# Patient Record
Sex: FEMALE | Employment: FULL TIME | URBAN - NONMETROPOLITAN AREA
[De-identification: names, ages, dates, MRNs, and addresses within clinical notes are randomized per-mention and may not be internally consistent; named-entity substitution may affect disease eponyms.]

---

## 2024-09-05 ENCOUNTER — TELEPHONE (OUTPATIENT)
Dept: FAMILY MEDICINE CLINIC | Facility: CLINIC | Age: 34
End: 2024-09-05

## 2024-09-09 ENCOUNTER — HOSPITAL ENCOUNTER (EMERGENCY)
Facility: HOSPITAL | Age: 34
Discharge: HOME | End: 2024-09-09
Attending: EMERGENCY MEDICINE
Payer: COMMERCIAL

## 2024-09-09 VITALS
OXYGEN SATURATION: 99 % | DIASTOLIC BLOOD PRESSURE: 88 MMHG | TEMPERATURE: 97.6 F | HEART RATE: 76 BPM | SYSTOLIC BLOOD PRESSURE: 162 MMHG | RESPIRATION RATE: 18 BRPM

## 2024-09-09 DIAGNOSIS — F41.9 ANXIETY: Primary | ICD-10-CM

## 2024-09-09 PROCEDURE — 99282 EMERGENCY DEPT VISIT SF MDM: CPT

## 2024-09-09 PROCEDURE — 99284 EMERGENCY DEPT VISIT MOD MDM: CPT | Performed by: EMERGENCY MEDICINE

## 2024-09-09 NOTE — Clinical Note
Dayanara Orantes was seen and treated in our emergency department on 9/9/2024.    No restrictions            Diagnosis:     Dayanara  may return to work on return date.    She may return on this date: 09/11/2024    Patient was seen in the ED for treatment of anxiety and well need off for this reason to address the issue medically.      If you have any questions or concerns, please don't hesitate to call.      Kristan Thomas, DO    ______________________________           _______________          _______________  Hospital Representative                              Date                                Time

## 2024-09-09 NOTE — Clinical Note
Dayanara Orantes was seen and treated in our emergency department on 9/9/2024.    No restrictions            Diagnosis:     Dayanara  may return to work on return date.    She may return on this date: 09/11/2024         If you have any questions or concerns, please don't hesitate to call.      Kristan Thomas, DO    ______________________________           _______________          _______________  Hospital Representative                              Date                                Time

## 2024-09-09 NOTE — ED PROVIDER NOTES
History  Chief Complaint   Patient presents with    Anxiety     Patient states she's been having a lot of anxiety due to working a lot of hours.Denies and SI/HI     Dayanara Bhatia is a 35 y/o F with PMHx of HTN who presents for anxiety. Patient states that recently she has been struggling with feelings of anxiety and having to sit down a lot a work. 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 She has been working constantly which is worsening the anxiety. Because of the interruptions to her work, her job asked her to get a letter documenting the anxiety. Reports previously being on an anxiety medication but is not on one now. In need of a PCP to manage this and her HTN. Came to the ED to get some medication for long term anxiety management. Denies SI/HI. Denies fever, chest pain, SOB, N/V, constipation, diarrhea.         Prior to Admission Medications   Prescriptions Last Dose Informant Patient Reported? Taking?   valACYclovir (VALTREX) 1,000 mg tablet   No No   Sig: Take 2 tablets (2,000 mg total) by mouth 2 (two) times a day for 1 day      Facility-Administered Medications: None       Past Medical History:   Diagnosis Date    Hypertension        Past Surgical History:   Procedure Laterality Date    BACK SURGERY         Family History   Problem Relation Age of Onset    Hypertension Mother     Hypertension Father      I have reviewed and agree with the history as documented.    E-Cigarette/Vaping    E-Cigarette Use Never User      E-Cigarette/Vaping Substances    Nicotine No     THC No     CBD No     Flavoring No     Other No     Unknown No      Social History     Tobacco Use    Smoking status: Never    Smokeless tobacco: Never   Vaping Use    Vaping status: Never Used   Substance Use Topics    Alcohol use: Yes     Comment: occasionally    Drug use: Not Currently        Review of Systems   Constitutional:  Negative for fever.   Respiratory:  Negative for  cough and shortness of breath.    Cardiovascular:  Negative for chest pain.   Gastrointestinal:  Negative for nausea and vomiting.   Genitourinary:  Negative for dysuria.   Musculoskeletal:  Negative for gait problem.   Skin:  Negative for rash.   Neurological:  Negative for syncope.   Psychiatric/Behavioral:  Negative for suicidal ideas. The patient is nervous/anxious.        Physical Exam  ED Triage Vitals [09/09/24 1248]   Temperature Pulse Respirations Blood Pressure SpO2   97.6 °F (36.4 °C) 76 18 162/88 99 %      Temp Source Heart Rate Source Patient Position - Orthostatic VS BP Location FiO2 (%)   Tympanic Monitor Sitting Right arm --      Pain Score       No Pain             Orthostatic Vital Signs  Vitals:    09/09/24 1248   BP: 162/88   Pulse: 76   Patient Position - Orthostatic VS: Sitting       Physical Exam  Vitals and nursing note reviewed.   Constitutional:       General: She is not in acute distress.     Appearance: Normal appearance. She is normal weight. She is not ill-appearing or toxic-appearing.   HENT:      Head: Normocephalic and atraumatic.      Nose: Nose normal.   Eyes:      Conjunctiva/sclera: Conjunctivae normal.   Cardiovascular:      Rate and Rhythm: Normal rate and regular rhythm.      Heart sounds: Normal heart sounds. No murmur heard.     No friction rub. No gallop.   Pulmonary:      Effort: Pulmonary effort is normal. No respiratory distress.      Breath sounds: Normal breath sounds. No wheezing, rhonchi or rales.   Skin:     General: Skin is warm and dry.      Findings: No rash.   Neurological:      General: No focal deficit present.      Mental Status: She is alert and oriented to person, place, and time.      Gait: Gait normal.   Psychiatric:         Mood and Affect: Mood normal.         Behavior: Behavior normal.         Thought Content: Thought content normal.         ED Medications  Medications - No data to display    Diagnostic Studies  Results Reviewed       None                    No orders to display         Procedures  Procedures      ED Course  ED Course as of 09/09/24 1341   Mon Sep 09, 2024   1248 Blood Pressure: 162/88  Patient anxious   1248 Temperature: 97.6 °F (36.4 °C)   1248 Pulse: 76   1248 SpO2: 99 %   1248 Respirations: 18                                       Medical Decision Making  35 y/o F with PMHx of HTN who presents for anxiety. Patient at low risk with no feelings of SI/HI. Discussed at length with her that the ED cannot provide long term anxiety management medication. Offered to see patient in [facility] to address issues further. Patient is agreeable to coming to a 2:40 PM appointment at [facility] on 9/10/24. Wrote note giving patient off until Wednesday 9/11/24 so that she is able to come to appointment tomorrow and manage anxiety symptoms in the meantime. Recommend rest, deep breathing exercises. Discussed treatment and plan with patient who verbalized understanding and agreement.     Problems Addressed:  Anxiety: chronic illness or injury    Amount and/or Complexity of Data Reviewed  External Data Reviewed: notes.    Risk  OTC drugs.  Prescription drug management.  Risk Details: Pt seen in ED for anxiety   Low risk for SI/HI   Recommend meditation, rest   Appointment made at [facility] for 9/10/24 at 2:40 PM   Pt safe for discharge home           Disposition  Final diagnoses:   Anxiety     Time reflects when diagnosis was documented in both MDM as applicable and the Disposition within this note       Time User Action Codes Description Comment    9/9/2024  1:07 PM Kristan Thomas Add [F41.9] Anxiety           ED Disposition       ED Disposition   Discharge    Condition   Stable    Date/Time   Mon Sep 9, 2024  1:08 PM    Comment   Dayanara Orantes discharge to home/self care.                   Follow-up Information       Follow up With Specialties Details Why Contact Info Additional Information    .  [facility] Medicine Go on 9/10/2024 2:40 PM [address] [facility] [id number] [phone] [facility], [address] [place] [address], [id number]  [phone]            Discharge Medication List as of 9/9/2024  1:08 PM        CONTINUE these medications which have NOT CHANGED    Details   valACYclovir (VALTREX) 1,000 mg tablet Take 2 tablets (2,000 mg total) by mouth 2 (two) times a day for 1 day, Starting Mon 8/19/2024, Until Tue 8/20/2024, Normal               PDMP Review       None             ED Provider  Attending physically available and evaluated Dayanara Lamberto. I managed the patient along with the ED Attending.    Electronically Signed by:    Kristan Thomas DO   PGY-2 Rural  Residency   [facility]      Kristan Thomas DO  09/09/24 0874

## 2024-09-09 NOTE — ED ATTENDING ATTESTATION
9/9/2024  I, Ruth Maciel MD, saw and evaluated the patient. I have discussed the patient with the resident/non-physician practitioner and agree with the resident's/non-physician practitioner's findings, Plan of Care, and MDM as documented in the resident's/non-physician practitioner's note, except where noted. All available labs and Radiology studies were reviewed.  I was present for key portions of any procedure(s) performed by the resident/non-physician practitioner and I was immediately available to provide assistance.       At this point I agree with the current assessment done in the Emergency Department.  I have conducted an independent evaluation of this patient a history and physical is as follows:    ED Course  34-year-old female presents with increased anxiety patient has a remote history of being on medication she is not currently on any medication she has a history of hypertension and tachycardia and is looking to establish with a doctor in the area.  There is no history of HI or SI. Np chest pain no SOB feels her covid symptoms have resovled was sent in from work.  PSHX back surg    VS noted with bp 162/88  HEENT normal  Chest CTA no wheezing prolongataion of exp phase no converasational dyspnea  CVs1s2 HR 80  Ext no edema  Neuro exam CN2-12 grossing intact over the extremities symmetrically gait steady        FM note from 12/1/22 reviewed.  As well as 2 previous ED visits 1 for COVID and the second for fever blisters.     Patient to follow up with Dr. Thomas tomorrow in the office to establish care    Critical Care Time  Procedures

## 2024-10-14 ENCOUNTER — HOSPITAL ENCOUNTER (EMERGENCY)
Facility: HOSPITAL | Age: 34
Discharge: HOME | End: 2024-10-14
Attending: EMERGENCY MEDICINE | Admitting: EMERGENCY MEDICINE
Payer: COMMERCIAL

## 2024-10-14 VITALS
OXYGEN SATURATION: 100 % | TEMPERATURE: 98 F | HEART RATE: 72 BPM | RESPIRATION RATE: 16 BRPM | DIASTOLIC BLOOD PRESSURE: 91 MMHG | SYSTOLIC BLOOD PRESSURE: 158 MMHG

## 2024-10-14 DIAGNOSIS — J06.9 URI (UPPER RESPIRATORY INFECTION): Primary | ICD-10-CM

## 2024-10-14 LAB
FLUAV AG UPPER RESP QL IA.RAPID: NEGATIVE
FLUBV AG UPPER RESP QL IA.RAPID: NEGATIVE
SARS-COV+SARS-COV-2 AG RESP QL IA.RAPID: NEGATIVE

## 2024-10-14 PROCEDURE — 99283 EMERGENCY DEPT VISIT LOW MDM: CPT | Performed by: EMERGENCY MEDICINE

## 2024-10-14 PROCEDURE — 87804 INFLUENZA ASSAY W/OPTIC: CPT | Performed by: EMERGENCY MEDICINE

## 2024-10-14 PROCEDURE — 87811 SARS-COV-2 COVID19 W/OPTIC: CPT | Performed by: EMERGENCY MEDICINE

## 2024-10-14 PROCEDURE — 99284 EMERGENCY DEPT VISIT MOD MDM: CPT

## 2024-10-14 NOTE — Clinical Note
Dayanara Orantes was seen and treated in our emergency department on 10/14/2024.                Diagnosis: viral illness    Dayanara  .    She may return on this date: 10/17/2024         If you have any questions or concerns, please don't hesitate to call.      Trish Garza MD    ______________________________           _______________          _______________  Hospital Representative                              Date                                Time

## 2024-10-15 NOTE — ED PROVIDER NOTES
Time reflects when diagnosis was documented in both MDM as applicable and the Disposition within this note       Time User Action Codes Description Comment    10/14/2024 10:18 PM DallasTrish Ritter Add [J06.9] URI (upper respiratory infection)           ED Disposition       ED Disposition   Discharge    Condition   Stable    Date/Time   Mon Oct 14, 2024 10:18 PM    Comment   Dayanara Orantes discharge to home/self care.                   Assessment & Plan       Medical Decision Making  Amount and/or Complexity of Data Reviewed  Labs: ordered.      34-year-old female presenting for evaluation of upper respiratory symptoms for three days, vitals normal.   Likely viral syndrome. Will test for covid/flu per patient request for work. Will provide patient a note for work. Discussed continued symptomatic treatment. Discussed strict return precautions.  Patient expressed understanding and was agreeable for discharge.       Medications - No data to display    ED Risk Strat Scores                                               History of Present Illness       Chief Complaint   Patient presents with    URI     Congestion, sneezing, coughing x3 days       Past Medical History:   Diagnosis Date    Hypertension       Past Surgical History:   Procedure Laterality Date    BACK SURGERY        Family History   Problem Relation Age of Onset    Hypertension Mother     Hypertension Father       Social History     Tobacco Use    Smoking status: Never    Smokeless tobacco: Never   Vaping Use    Vaping status: Never Used   Substance Use Topics    Alcohol use: Yes     Comment: occasionally    Drug use: Not Currently      E-Cigarette/Vaping    E-Cigarette Use Never User       E-Cigarette/Vaping Substances    Nicotine No     THC No     CBD No     Flavoring No     Other No     Unknown No       I have reviewed and agree with the history as documented.     HPI    34-year-old female presenting for evaluation of upper respiratory symptoms.   Patient states she has had cough and congestion for the past 3 days.  She was also having fevers today.  She did take Tylenol prior to coming to the ER.  She states she feels fatigued and has had myalgias.  Denies chest pain or shortness of breath.  She has had slightly decreased appetite.  Denies diarrhea.  Denies abdominal pain.  Denies leg swelling.  She states her son was also sick with similar symptoms recently.  She states she at work today and was sent and sent to the ER for evaluation.    Review of Systems   Constitutional:  Positive for appetite change and fever. Negative for chills.   HENT:  Positive for congestion and rhinorrhea. Negative for sore throat.    Respiratory:  Positive for cough. Negative for shortness of breath.    Cardiovascular:  Negative for chest pain.   Gastrointestinal:  Negative for abdominal pain, diarrhea, nausea and vomiting.   Genitourinary:  Negative for dysuria, frequency, hematuria and urgency.   Musculoskeletal:  Positive for myalgias. Negative for arthralgias.   Skin:  Negative for rash.   Neurological:  Negative for dizziness, weakness, light-headedness, numbness and headaches.   All other systems reviewed and are negative.          Objective       ED Triage Vitals   Temperature Pulse Blood Pressure Respirations SpO2 Patient Position - Orthostatic VS   10/14/24 2208 10/14/24 2210 10/14/24 2210 10/14/24 2208 10/14/24 2210 --   98 °F (36.7 °C) 72 158/91 16 100 %       Temp Source Heart Rate Source BP Location FiO2 (%) Pain Score    10/14/24 2208 10/14/24 2210 -- -- --    Temporal Monitor         Vitals      Date and Time Temp Pulse SpO2 Resp BP Pain Score FACES Pain Rating User   10/14/24 2210 -- 72 100 % -- 158/91 -- -- JL   10/14/24 2208 98 °F (36.7 °C) -- -- 16 -- -- -- JL            Physical Exam  Vitals and nursing note reviewed.   Constitutional:       General: She is not in acute distress.     Appearance: Normal appearance. She is well-developed and normal weight. She is  not ill-appearing, toxic-appearing or diaphoretic.   HENT:      Head: Normocephalic and atraumatic.      Right Ear: External ear normal.      Left Ear: External ear normal.      Nose: Nose normal.      Mouth/Throat:      Mouth: Mucous membranes are moist.      Pharynx: Oropharynx is clear.   Eyes:      Extraocular Movements: Extraocular movements intact.      Conjunctiva/sclera: Conjunctivae normal.   Cardiovascular:      Rate and Rhythm: Normal rate and regular rhythm.      Pulses: Normal pulses.      Heart sounds: Normal heart sounds. No murmur heard.     No friction rub. No gallop.   Pulmonary:      Effort: Pulmonary effort is normal. No respiratory distress.      Breath sounds: Normal breath sounds. No wheezing or rales.   Abdominal:      General: There is no distension.      Palpations: Abdomen is soft.      Tenderness: There is no abdominal tenderness. There is no guarding or rebound.   Musculoskeletal:         General: No tenderness.      Cervical back: Neck supple.      Right lower leg: No edema.      Left lower leg: No edema.   Skin:     General: Skin is warm and dry.      Coloration: Skin is not pale.      Findings: No erythema or rash.   Neurological:      General: No focal deficit present.      Mental Status: She is alert and oriented to person, place, and time.      Cranial Nerves: No cranial nerve deficit.      Sensory: No sensory deficit.      Motor: No weakness.   Psychiatric:         Mood and Affect: Mood normal.         Behavior: Behavior normal.         Results Reviewed       Procedure Component Value Units Date/Time    FLU/COVID Rapid Antigen (30 min. TAT) - Preferred screening test in ED [[id number]]  (Normal) Collected: 10/14/24 2222    Lab Status: Final result Specimen: Nares from Nose Updated: 10/14/24 2246     SARS COV Rapid Antigen Negative     Influenza A Rapid Antigen Negative     Influenza B Rapid Antigen Negative    Narrative:      This test has been performed using the Quidel Danuta 2  FLU+SARS Antigen test under the Emergency Use Authorization (EUA). This test has been validated by the  and verified by the performing laboratory. The Danuta uses lateral flow immunofluorescent sandwich assay to detect SARS-COV, Influenza A and Influenza B Antigen.     The Popbasic[employer] Danuta 2 SARS Antigen test does not differentiate between SARS-CoV and SARS-CoV-2.     Negative results are presumptive and may be confirmed with a molecular assay, if necessary, for patient management. Negative results do not rule out SARS-CoV-2 or influenza infection and should not be used as the sole basis for treatment or patient management decisions. A negative test result may occur if the level of antigen in a sample is below the limit of detection of this test.     Positive results are indicative of the presence of viral antigens, but do not rule out bacterial infection or co-infection with other viruses.     All test results should be used as an adjunct to clinical observations and other information available to the provider.    FOR PEDIATRIC PATIENTS - copy/paste COVID Guidelines URL to browser: [email or web address]            No orders to display       Procedures    ED Medication and Procedure Management   Prior to Admission Medications   Prescriptions Last Dose Informant Patient Reported? Taking?   valACYclovir (VALTREX) 1,000 mg tablet   No No   Sig: Take 2 tablets (2,000 mg total) by mouth 2 (two) times a day for 1 day      Facility-Administered Medications: None     Discharge Medication List as of 10/14/2024 10:19 PM        CONTINUE these medications which have NOT CHANGED    Details   valACYclovir (VALTREX) 1,000 mg tablet Take 2 tablets (2,000 mg total) by mouth 2 (two) times a day for 1 day, Starting Mon 8/19/2024, Until Tue 8/20/2024, Normal           No discharge procedures on file.  ED SEPSIS DOCUMENTATION   Time reflects when diagnosis was documented in  both MDM as applicable and the Disposition within this note       Time User Action Codes Description Comment    10/14/2024 10:18 PM Trish Garza Add [J06.9] URI (upper respiratory infection)                  Trish Garza MD  10/15/24 0400

## 2024-12-05 ENCOUNTER — HOSPITAL ENCOUNTER (EMERGENCY)
Facility: HOSPITAL | Age: 34
Discharge: HOME | End: 2024-12-05
Attending: EMERGENCY MEDICINE
Payer: COMMERCIAL

## 2024-12-05 VITALS
SYSTOLIC BLOOD PRESSURE: 148 MMHG | OXYGEN SATURATION: 98 % | HEART RATE: 74 BPM | BODY MASS INDEX: 25.61 KG/M2 | TEMPERATURE: 99.1 F | DIASTOLIC BLOOD PRESSURE: 91 MMHG | WEIGHT: 150 LBS | HEIGHT: 64 IN | RESPIRATION RATE: 18 BRPM

## 2024-12-05 DIAGNOSIS — R53.1 WEAKNESS: ICD-10-CM

## 2024-12-05 DIAGNOSIS — B34.9 VIRAL SYNDROME: Primary | ICD-10-CM

## 2024-12-05 DIAGNOSIS — M79.10 MYALGIA: ICD-10-CM

## 2024-12-05 PROCEDURE — 99283 EMERGENCY DEPT VISIT LOW MDM: CPT

## 2024-12-05 PROCEDURE — 99284 EMERGENCY DEPT VISIT MOD MDM: CPT | Performed by: EMERGENCY MEDICINE

## 2024-12-05 NOTE — Clinical Note
Dayanara Jacksonkristopheralex was seen and treated in our emergency department on 12/5/2024.                Diagnosis:     Dayanara  .    She may return on this date:     Excused on 12/5 for viral syndrome, severe body aches, weakness     If you have any questions or concerns, please don't hesitate to call.      Tico Willams MD    ______________________________           _______________          _______________  Hospital Representative                              Date                                Time

## 2024-12-05 NOTE — Clinical Note
Dayanara Orantes was seen and treated in our emergency department on 12/5/2024.                Diagnosis:     Dayanara  .    She may return on this date: 12/06/2024    Excused on 12/5 for viral syndrome, severe body aches, weakness     If you have any questions or concerns, please don't hesitate to call.      Tico Willams MD    ______________________________           _______________          _______________  Hospital Representative                              Date                                Time

## 2024-12-09 NOTE — ED PROVIDER NOTES
Time reflects when diagnosis was documented in both MDM as applicable and the Disposition within this note       Time User Action Codes Description Comment    12/5/2024 10:55 PM Tico Willams [B34.9] Viral syndrome     12/5/2024 10:55 PM Tico Willams [M79.10] Myalgia     12/5/2024 10:55 PM Tico Willams [R53.1] Weakness           ED Disposition       ED Disposition   Discharge    Condition   Stable    Date/Time   u Dec 5, 2024 10:55 PM    Comment   Dayanara Orantes discharge to home/self care.                   Assessment & Plan       Medical Decision Making  I reviewed the patient's medical chart, PMHx, prior encounters, medications.    My DDx includes: Viral syndrome, covid, influenza, RSV    Patient's symptoms are consistent with viral syndrome, no evidence of focal bacterial infection.  Will provide patient a work note, discharged with strict return precautions, recommend alternating Tylenol and Motrin for symptomatic relief.             Medications - No data to display    ED Risk Strat Scores                           SBIRT 20yo+      Flowsheet Row Most Recent Value   Initial Alcohol Screen: US AUDIT-C     1. How often do you have a drink containing alcohol? 0 Filed at: 12/05/2024 2248   2. How many drinks containing alcohol do you have on a typical day you are drinking?  0 Filed at: 12/05/2024 2248   3b. FEMALE Any Age, or MALE 65+: How often do you have 4 or more drinks on one occassion? 0 Filed at: 12/05/2024 2248   Audit-C Score 0 Filed at: 12/05/2024 2248   SAMMI: How many times in the past year have you...    Used an illegal drug or used a prescription medication for non-medical reasons? Never Filed at: 12/05/2024 2248                            History of Present Illness       Chief Complaint   Patient presents with    Generalized Body Aches     Pt presents with body aches, headaches, tiredness, and breast pain.  Patient unable to go to work at [employer] tonight        Past Medical History:   Diagnosis Date    Hypertension       Past Surgical History:   Procedure Laterality Date    BACK SURGERY        Family History   Problem Relation Age of Onset    Hypertension Mother     Hypertension Father       Social History     Tobacco Use    Smoking status: Never    Smokeless tobacco: Never   Vaping Use    Vaping status: Never Used   Substance Use Topics    Alcohol use: Yes     Comment: occasionally    Drug use: Not Currently      E-Cigarette/Vaping    E-Cigarette Use Never User       E-Cigarette/Vaping Substances    Nicotine No     THC No     CBD No     Flavoring No     Other No     Unknown No       I have reviewed and agree with the history as documented.     34 y.o. female who presents for flu-like illness. Patient reports symptoms started 1 days ago. Reports no fevers,+ congestion, cough, , no ear pain, no sore throat, no abdominal pain, no nausea +/- vomiting, no diarrhea, no decreased appetite. + myalgias.  ROS otherwise negative.          Review of Systems   Constitutional:  Negative for chills and fever.   HENT:  Negative for rhinorrhea and sore throat.    Respiratory:  Negative for shortness of breath.    Cardiovascular:  Negative for chest pain and palpitations.   Gastrointestinal:  Negative for abdominal pain, constipation, diarrhea, nausea and vomiting.   Genitourinary:  Negative for difficulty urinating and flank pain.   Musculoskeletal:  Positive for myalgias. Negative for arthralgias.   Neurological:  Negative for dizziness, weakness, light-headedness and headaches.   Psychiatric/Behavioral:  Negative for agitation, behavioral problems and confusion.    All other systems reviewed and are negative.          Objective       ED Triage Vitals [12/05/24 2245]   Temperature Pulse Blood Pressure Respirations SpO2 Patient Position - Orthostatic VS   99.1 °F (37.3 °C) 74 148/91 18 98 % Sitting      Temp Source Heart Rate Source BP Location FiO2 (%) Pain Score    Temporal  Monitor Right arm -- 10 - Worst Possible Pain      Vitals      Date and Time Temp Pulse SpO2 Resp BP Pain Score FACES Pain Rating User   12/05/24 2245 99.1 °F (37.3 °C) 74 98 % 18 148/91 10 - Worst Possible Pain -- BB            Physical Exam  Vitals and nursing note reviewed.   Constitutional:       Appearance: She is well-developed.      Comments: Well-appearing   HENT:      Head: Normocephalic and atraumatic.      Right Ear: Tympanic membrane normal.      Left Ear: Tympanic membrane normal.      Ears:      Comments: Left TM is gray, with no bulging or erythema.  Right TM is gray, with no bulging or erythema.     Nose: Nose normal. No congestion or rhinorrhea.      Mouth/Throat:      Mouth: Mucous membranes are moist.      Pharynx: No oropharyngeal exudate or posterior oropharyngeal erythema.      Comments: Moist mucous membranes. No tonsillar enlargement, erythema, exudate. No evidence of PTA/RPA  Eyes:      General:         Right eye: No discharge.         Left eye: No discharge.      Conjunctiva/sclera: Conjunctivae normal.   Cardiovascular:      Rate and Rhythm: Normal rate and regular rhythm.      Heart sounds: Normal heart sounds. No murmur heard.     No friction rub.   Pulmonary:      Effort: Pulmonary effort is normal. No respiratory distress.      Breath sounds: Normal breath sounds. No stridor. No wheezing, rhonchi or rales.      Comments: Clear breath sounds BL, no respiratory distress  Abdominal:      General: Abdomen is flat. Bowel sounds are normal. There is no distension.      Palpations: Abdomen is soft.      Tenderness: There is no abdominal tenderness.      Comments: Belly is soft, non-tender.    Musculoskeletal:         General: No deformity. Normal range of motion.      Cervical back: Normal range of motion and neck supple.   Skin:     General: Skin is warm and dry.      Capillary Refill: Capillary refill takes less than 2 seconds.      Findings: No rash.   Neurological:      Mental Status: She  is alert and oriented to person, place, and time. Mental status is at baseline.      Coordination: Coordination normal.   Psychiatric:         Mood and Affect: Mood normal.         Behavior: Behavior normal.         Thought Content: Thought content normal.         Judgment: Judgment normal.         Results Reviewed       None            No orders to display       Procedures    ED Medication and Procedure Management   Prior to Admission Medications   Prescriptions Last Dose Informant Patient Reported? Taking?   valACYclovir (VALTREX) 1,000 mg tablet   No No   Sig: Take 2 tablets (2,000 mg total) by mouth 2 (two) times a day for 1 day      Facility-Administered Medications: None     Discharge Medication List as of 12/5/2024 10:56 PM        CONTINUE these medications which have NOT CHANGED    Details   valACYclovir (VALTREX) 1,000 mg tablet Take 2 tablets (2,000 mg total) by mouth 2 (two) times a day for 1 day, Starting Mon 8/19/2024, Until Tue 8/20/2024, Normal           No discharge procedures on file.  ED SEPSIS DOCUMENTATION   Time reflects when diagnosis was documented in both MDM as applicable and the Disposition within this note       Time User Action Codes Description Comment    12/5/2024 10:55 PM Tico Willams [B34.9] Viral syndrome     12/5/2024 10:55 PM Tico Willams [M79.10] Myalgia     12/5/2024 10:55 PM Tico Willams [R53.1] Weakness                  Tico Willams MD  12/09/24 0233

## 2024-12-23 ENCOUNTER — HOSPITAL ENCOUNTER (EMERGENCY)
Facility: HOSPITAL | Age: 34
Discharge: HOME | End: 2024-12-23
Attending: EMERGENCY MEDICINE
Payer: COMMERCIAL

## 2024-12-23 VITALS
RESPIRATION RATE: 20 BRPM | SYSTOLIC BLOOD PRESSURE: 156 MMHG | TEMPERATURE: 97.6 F | HEART RATE: 90 BPM | DIASTOLIC BLOOD PRESSURE: 99 MMHG | OXYGEN SATURATION: 97 %

## 2024-12-23 DIAGNOSIS — J02.9 PHARYNGITIS: Primary | ICD-10-CM

## 2024-12-23 LAB
FLUAV AG UPPER RESP QL IA.RAPID: NEGATIVE
FLUBV AG UPPER RESP QL IA.RAPID: NEGATIVE
S PYO DNA THROAT QL NAA+PROBE: DETECTED
SARS-COV+SARS-COV-2 AG RESP QL IA.RAPID: NEGATIVE

## 2024-12-23 PROCEDURE — 99284 EMERGENCY DEPT VISIT MOD MDM: CPT | Performed by: EMERGENCY MEDICINE

## 2024-12-23 PROCEDURE — 87811 SARS-COV-2 COVID19 W/OPTIC: CPT

## 2024-12-23 PROCEDURE — 87804 INFLUENZA ASSAY W/OPTIC: CPT

## 2024-12-23 PROCEDURE — 87651 STREP A DNA AMP PROBE: CPT

## 2024-12-23 PROCEDURE — 99283 EMERGENCY DEPT VISIT LOW MDM: CPT

## 2024-12-23 RX ORDER — IBUPROFEN 600 MG/1
600 TABLET, FILM COATED ORAL ONCE
Status: COMPLETED | OUTPATIENT
Start: 2024-12-23 | End: 2024-12-23

## 2024-12-23 RX ORDER — AMOXICILLIN 250 MG/1
500 CAPSULE ORAL ONCE
Status: COMPLETED | OUTPATIENT
Start: 2024-12-23 | End: 2024-12-23

## 2024-12-23 RX ORDER — AMOXICILLIN 500 MG/1
500 CAPSULE ORAL 3 TIMES DAILY
Qty: 21 CAPSULE | Refills: 0 | Status: SHIPPED | OUTPATIENT
Start: 2024-12-23 | End: 2024-12-30

## 2024-12-23 RX ADMIN — AMOXICILLIN 500 MG: 250 CAPSULE ORAL at 21:16

## 2024-12-23 RX ADMIN — IBUPROFEN 600 MG: 600 TABLET, FILM COATED ORAL at 21:16

## 2024-12-23 RX ADMIN — DEXAMETHASONE SODIUM PHOSPHATE 10 MG: 10 INJECTION, SOLUTION INTRAMUSCULAR; INTRAVENOUS at 21:16

## 2024-12-23 NOTE — Clinical Note
Dayanara Orantes was seen and treated in our emergency department on 12/23/2024.                Diagnosis: Streptococcal pharyngitis    Dayanara  may return to work on return date.    She may return on this date: 12/24/2024    Dayanara Orantes required evaluation in the emergency department for an infection that required a physician evaluation and initiation of antibiotics. Please excuse absence on 12/23/2024. Patient is cleared to return to work on 12/24/2024.      If you have any questions or concerns, please don't hesitate to call.      Jessie Amaya MD    ______________________________           _______________          _______________  Hospital Representative                              Date                                Time

## 2024-12-23 NOTE — Clinical Note
Dayanara Orantes was seen and treated in our emergency department on 12/23/2024.                Diagnosis:     Dayanara  may return to work on return date.    She may return on this date: 12/24/2024    Please excuse absence on 12/23/2024     If you have any questions or concerns, please don't hesitate to call.      Jessie Amaya MD    ______________________________           _______________          _______________  Hospital Representative                              Date                                Time

## 2024-12-24 NOTE — DISCHARGE INSTRUCTIONS
You have strep throat, a bacterial infection of your throat. Please complete entire course of amoxicillin to treat the infection. Continue taking Tylenol for pain and any fevers. You may also take ibuprofen for pain and fevers. Hydrate well with water and electrolyte containing fluids. We recommend good hand washing.  Your covid and flu tests today are negative.

## 2024-12-24 NOTE — ED PROVIDER NOTES
Time reflects when diagnosis was documented in both MDM as applicable and the Disposition within this note       Time User Action Codes Description Comment    12/23/2024  9:10 PM Jessie Amaya Woody [J02.9] Pharyngitis           ED Disposition       ED Disposition   Discharge    Condition   Stable    Date/Time   Mon Dec 23, 2024  9:10 PM    Comment   Dayanara Renettashane discharge to home/self care.                   Assessment & Plan       Medical Decision Making  34 y.o. female presenting with sore throat and pain with swallowing. VS reviewed, hypertensive, within normal limits otherwise.     Differential diagnosis includes upper respiratory infection due to viral illness such as Covid-19, RSV, influenza, versus another virus; versus other etiologies such as streptococcal pharyngitis, acute otitis media, bronchitis, pneumonia, etc. History and physical exam are not consistent with pneumonia, however, are quite suggestive of streptococcal pharyngitis.  Strep a PCR is positive.  Viral panel is negative.  Decadron administered for pharyngitis, Motrin for pain, and amoxicillin for streptococcal infection.  Recommend symptomatic treatment with OTC Tylenol or ibuprofen for pain, complete entire course of amoxicillin.    Seek medical attention if difficulty breathing, unable to keep anything down by mouth, dehydration, persistent chest pain, and as needed. Follow up with PCP in 1-3 days for re-evaluation of symptoms. Patient discharged to home with recommendations for symptom control, return precautions, and plan for follow up.       Problems Addressed:  Pharyngitis: acute illness or injury    Amount and/or Complexity of Data Reviewed  External Data Reviewed: notes.  Labs: ordered. Decision-making details documented in ED Course.    Risk  Prescription drug management.             Medications   dexamethasone oral liquid 10 mg 1 mL (10 mg Oral Given 12/23/24 2116)   ibuprofen (MOTRIN) tablet 600 mg (600 mg Oral Given  12/23/24 2116)   amoxicillin (AMOXIL) capsule 500 mg (500 mg Oral Given 12/23/24 2116)       ED Risk Strat Scores                          SBIRT 22yo+      Flowsheet Row Most Recent Value   Initial Alcohol Screen: US AUDIT-C     1. How often do you have a drink containing alcohol? 0 Filed at: 12/23/2024 2029   2. How many drinks containing alcohol do you have on a typical day you are drinking?  0 Filed at: 12/23/2024 2029   3a. Male UNDER 65: How often do you have five or more drinks on one occasion? 0 Filed at: 12/23/2024 2029   3b. FEMALE Any Age, or MALE 65+: How often do you have 4 or more drinks on one occassion? 0 Filed at: 12/23/2024 2029   Audit-C Score 0 Filed at: 12/23/2024 2029   SAMMI: How many times in the past year have you...    Used an illegal drug or used a prescription medication for non-medical reasons? Never Filed at: 12/23/2024 2029                            History of Present Illness       Chief Complaint   Patient presents with    Sore Throat     Patient states she was here the other day for body aches. Yesterday the patient started with a sore throat. Denies any fevers. Pain 8/10. Took tylenol 2 hours ago       Past Medical History:   Diagnosis Date    Hypertension       Past Surgical History:   Procedure Laterality Date    BACK SURGERY        Family History   Problem Relation Age of Onset    Hypertension Mother     Hypertension Father       Social History     Tobacco Use    Smoking status: Never    Smokeless tobacco: Never   Vaping Use    Vaping status: Never Used   Substance Use Topics    Alcohol use: Yes     Comment: occasionally    Drug use: Not Currently      E-Cigarette/Vaping    E-Cigarette Use Never User       E-Cigarette/Vaping Substances    Nicotine No     THC No     CBD No     Flavoring No     Other No     Unknown No       I have reviewed and agree with the history as documented.     34-year-old female with no significant past medical history presenting with significant sore  throat.  Patient was recently seen for body aches.  These appear to be getting better, however, since last night, patient has had significant sore throat and odynophagia.  She has been taking Tylenol for her pain.  She has not noted any fevers.  She did have a leftover dose of amoxicillin leftover which she has taken and felt like it improved her symptoms.  No chest pain, no shortness of breath.        Review of Systems   Constitutional:  Negative for fever.   HENT:  Positive for sore throat. Negative for rhinorrhea.    Respiratory:  Negative for shortness of breath.    Cardiovascular:  Negative for chest pain.   Gastrointestinal:  Negative for abdominal pain, diarrhea and vomiting.   All other systems reviewed and are negative.          Objective       ED Triage Vitals [12/23/24 2030]   Temperature Pulse Blood Pressure Respirations SpO2 Patient Position - Orthostatic VS   97.6 °F (36.4 °C) 92 157/95 16 97 % --      Temp Source Heart Rate Source BP Location FiO2 (%) Pain Score    Temporal Monitor -- -- 8      Vitals      Date and Time Temp Pulse SpO2 Resp BP Pain Score FACES Pain Rating User   12/23/24 2116 -- -- -- -- -- 8 -- CS   12/23/24 2100 -- 90 97 % 20 156/99 -- -- CS   12/23/24 2032 -- -- -- -- -- 8 -- CK   12/23/24 2030 97.6 °F (36.4 °C) 92 97 % 16 157/95 8 -- CK            Physical Exam    ED Triage Vitals [12/23/24 2030]   Temperature Pulse Respirations Blood Pressure SpO2   97.6 °F (36.4 °C) 92 16 157/95 97 %      Temp Source Heart Rate Source Patient Position - Orthostatic VS BP Location FiO2 (%)   Temporal Monitor -- -- --      Pain Score       8           Constitutional:  Awake, alert, oriented.  No acute distress.  HEENT:  Normocephalic, atraumatic.  Sclera anicteric, conjunctiva not injected.  Posterior oropharynx is erythematous, there is tonsillar enlargement and exudates bilaterally.  Uvula elevates midline.  No trismus.  Moist oral mucosa.  Cardiac:  Appears well-perfused, regular rate and  rhythm, no murmurs  Respiratory:  Breathing comfortably on room air, lungs clear to auscultation bilaterally  Abdomen:  Nondistended  Extremities:  No deformities, no edema  Integument:  No rashes over exposed areas, cap refill less than 2 seconds  Neurologic:  Awake, alert, and oriented x3.  Nonfocal exam.  Psychiatric:  Normal affect      Results Reviewed       Procedure Component Value Units Date/Time    Strep A PCR [[id number]]  (Abnormal) Collected: 12/23/24 2042    Lab Status: Final result Specimen: Throat Updated: 12/23/24 2113     STREP A PCR Detected    FLU/COVID Rapid Antigen (30 min. TAT) - Preferred screening test in ED [[id number]]  (Normal) Collected: 12/23/24 2042    Lab Status: Final result Specimen: Nares from Nose Updated: 12/23/24 2109     SARS COV Rapid Antigen Negative     Influenza A Rapid Antigen Negative     Influenza B Rapid Antigen Negative    Narrative:      This test has been performed using the [employer] 2 FLU+SARS Antigen test under the Emergency Use Authorization (EUA). This test has been validated by the  and verified by the performing laboratory. The Danuta uses lateral flow immunofluorescent sandwich assay to detect SARS-COV, Influenza A and Influenza B Antigen.     The Quidel Danuta 2 SARS Antigen test does not differentiate between SARS-CoV and SARS-CoV-2.     Negative results are presumptive and may be confirmed with a molecular assay, if necessary, for patient management. Negative results do not rule out SARS-CoV-2 or influenza infection and should not be used as the sole basis for treatment or patient management decisions. A negative test result may occur if the level of antigen in a sample is below the limit of detection of this test.     Positive results are indicative of the presence of viral antigens, but do not rule out bacterial infection or co-infection with other viruses.     All test results should be used as an adjunct to clinical observations and other  information available to the provider.    FOR PEDIATRIC PATIENTS - copy/paste COVID Guidelines URL to browser: [email or web address]            No orders to display       Procedures    ED Medication and Procedure Management   Prior to Admission Medications   Prescriptions Last Dose Informant Patient Reported? Taking?   valACYclovir (VALTREX) 1,000 mg tablet   No No   Sig: Take 2 tablets (2,000 mg total) by mouth 2 (two) times a day for 1 day      Facility-Administered Medications: None     Discharge Medication List as of 12/23/2024  9:24 PM        START taking these medications    Details   amoxicillin (AMOXIL) 500 mg capsule Take 1 capsule (500 mg total) by mouth 3 (three) times a day for 7 days, Starting Mon 12/23/2024, Until Mon 12/30/2024, Normal           CONTINUE these medications which have NOT CHANGED    Details   valACYclovir (VALTREX) 1,000 mg tablet Take 2 tablets (2,000 mg total) by mouth 2 (two) times a day for 1 day, Starting Mon 8/19/2024, Until Tue 8/20/2024, Normal           No discharge procedures on file.  ED SEPSIS DOCUMENTATION   Time reflects when diagnosis was documented in both MDM as applicable and the Disposition within this note       Time User Action Codes Description Comment    12/23/2024  9:10 PM Jessie Amaya Add [J02.9] Pharyngitis                  Jessie Amaya MD  12/25/24 1899       Jessie Amaya MD  12/26/24 5613

## 2025-01-13 ENCOUNTER — HOSPITAL ENCOUNTER (EMERGENCY)
Facility: HOSPITAL | Age: 35
Discharge: HOME | End: 2025-01-13
Attending: EMERGENCY MEDICINE
Payer: COMMERCIAL

## 2025-01-13 VITALS
OXYGEN SATURATION: 98 % | DIASTOLIC BLOOD PRESSURE: 77 MMHG | TEMPERATURE: 97.8 F | BODY MASS INDEX: 28.31 KG/M2 | WEIGHT: 164.9 LBS | RESPIRATION RATE: 18 BRPM | HEART RATE: 77 BPM | SYSTOLIC BLOOD PRESSURE: 142 MMHG

## 2025-01-13 DIAGNOSIS — B34.9 VIRAL SYNDROME: Primary | ICD-10-CM

## 2025-01-13 PROCEDURE — 87804 INFLUENZA ASSAY W/OPTIC: CPT

## 2025-01-13 PROCEDURE — 99283 EMERGENCY DEPT VISIT LOW MDM: CPT

## 2025-01-13 PROCEDURE — 99283 EMERGENCY DEPT VISIT LOW MDM: CPT | Performed by: EMERGENCY MEDICINE

## 2025-01-13 PROCEDURE — 87811 SARS-COV-2 COVID19 W/OPTIC: CPT

## 2025-01-13 RX ORDER — ACETAMINOPHEN 325 MG/1
975 TABLET ORAL ONCE
Status: COMPLETED | OUTPATIENT
Start: 2025-01-13 | End: 2025-01-13

## 2025-01-13 RX ADMIN — ACETAMINOPHEN 975 MG: 325 TABLET, FILM COATED ORAL at 21:16

## 2025-01-13 NOTE — Clinical Note
Daynaara Orantes was seen and treated in our emergency department on 1/13/2025.                Diagnosis:     Dayanara  may return to work on return date.    She may return on this date: 01/15/2025         If you have any questions or concerns, please don't hesitate to call.      Arnol Knight MD    ______________________________           _______________          _______________  Hospital Representative                              Date                                Time

## 2025-01-14 NOTE — ED PROVIDER NOTES
Time reflects when diagnosis was documented in both MDM as applicable and the Disposition within this note       Time User Action Codes Description Comment    1/13/2025 10:13 PM Eugene, Arnol JUAN JOSÉ Add [B34.9] Viral syndrome           ED Disposition       ED Disposition   Discharge    Condition   Stable    Date/Time   Mon Jan 13, 2025 10:13 PM    Comment   Dayanara Lamberto discharge to home/self care.                   Assessment & Plan       Medical Decision Making  34-year-old female presenting with bodyaches.  Patient states symptoms started about 2 days ago, 1 son has flulike symptoms with a positive home COVID test.  Patient herself has only having bodyaches but no respiratory symptoms.  Has not recently taking anything for her body aches.  Otherwise no other complaints.  Patient is requesting note for work and school.    Physical exam is unremarkable.  Plan for flu COVID swab and Tylenol in the ED and discharged with symptomatic treatment.    Amount and/or Complexity of Data Reviewed  Labs: ordered. Decision-making details documented in ED Course.    Risk  OTC drugs.        ED Course as of 01/13/25 2258   Mon Jan 13, 2025   2213 FLU/COVID Rapid Antigen (30 min. TAT) - Preferred screening test in ED  Negative       Medications   acetaminophen (TYLENOL) tablet 975 mg (975 mg Oral Given 1/13/25 2116)       ED Risk Strat Scores                          SBIRT 22yo+      Flowsheet Row Most Recent Value   Initial Alcohol Screen: US AUDIT-C     1. How often do you have a drink containing alcohol? 0 Filed at: 01/13/2025 2058   2. How many drinks containing alcohol do you have on a typical day you are drinking?  0 Filed at: 01/13/2025 2058   3a. Male UNDER 65: How often do you have five or more drinks on one occasion? 0 Filed at: 01/13/2025 2058   3b. FEMALE Any Age, or MALE 65+: How often do you have 4 or more drinks on one occassion? 0 Filed at: 01/13/2025 2058   Audit-C Score 0 Filed at: 01/13/2025 2058   SAMMI: How  many times in the past year have you...    Used an illegal drug or used a prescription medication for non-medical reasons? Never Filed at: 01/13/2025 2058                            History of Present Illness       Chief Complaint   Patient presents with    Medical Problem     Family member tested positive for covid        Past Medical History:   Diagnosis Date    Hypertension       Past Surgical History:   Procedure Laterality Date    BACK SURGERY        Family History   Problem Relation Age of Onset    Hypertension Mother     Hypertension Father       Social History     Tobacco Use    Smoking status: Never    Smokeless tobacco: Never   Vaping Use    Vaping status: Never Used   Substance Use Topics    Alcohol use: Yes     Comment: occasionally    Drug use: Not Currently      E-Cigarette/Vaping    E-Cigarette Use Never User       E-Cigarette/Vaping Substances    Nicotine No     THC No     CBD No     Flavoring No     Other No     Unknown No       I have reviewed and agree with the history as documented.     34-year-old female presenting with bodyaches.  Patient states symptoms started about 2 days ago, 1 son has flulike symptoms with a positive home COVID test.  Patient herself has only having bodyaches but no respiratory symptoms.  Has not recently taking anything for her body aches.  Otherwise no other complaints.  Patient is requesting note for work and school.        Review of Systems   Constitutional:  Negative for chills and fever.   HENT:  Negative for ear pain and sore throat.    Respiratory:  Negative for cough and shortness of breath.    Cardiovascular:  Negative for chest pain and palpitations.   Gastrointestinal:  Negative for abdominal pain, nausea and vomiting.   Genitourinary:  Negative for dysuria.   All other systems reviewed and are negative.          Objective       ED Triage Vitals [01/13/25 2054]   Temperature Pulse Blood Pressure Respirations SpO2 Patient Position - Orthostatic VS   97.8 °F  (36.6 °C) 80 146/88 18 98 % Sitting      Temp Source Heart Rate Source BP Location FiO2 (%) Pain Score    Temporal Monitor Right arm -- No Pain      Vitals      Date and Time Temp Pulse SpO2 Resp BP Pain Score FACES Pain Rating User   01/13/25 2210 97.8 °F (36.6 °C) 77 98 % 18 142/77 6 --    01/13/25 2144 -- -- -- -- -- 6 --    01/13/25 2116 -- -- -- -- -- 8 --    01/13/25 2054 97.8 °F (36.6 °C) 80 98 % 18 146/88 No Pain -- RJP            Physical Exam  Vitals and nursing note reviewed.   Constitutional:       General: She is not in acute distress.     Appearance: She is well-developed.   HENT:      Head: Normocephalic and atraumatic.      Nose: Nose normal. No congestion.   Eyes:      Extraocular Movements: Extraocular movements intact.      Conjunctiva/sclera: Conjunctivae normal.   Cardiovascular:      Rate and Rhythm: Normal rate and regular rhythm.      Pulses: Normal pulses.      Heart sounds: Normal heart sounds. No murmur heard.  Pulmonary:      Effort: Pulmonary effort is normal. No respiratory distress.      Breath sounds: Normal breath sounds.   Chest:      Chest wall: No tenderness.   Abdominal:      General: Abdomen is flat. Bowel sounds are normal.      Palpations: Abdomen is soft.      Tenderness: There is no abdominal tenderness. There is no right CVA tenderness or left CVA tenderness.   Musculoskeletal:         General: No deformity or signs of injury. Normal range of motion.      Cervical back: Normal range of motion and neck supple. No rigidity or tenderness.   Skin:     General: Skin is warm and dry.      Findings: No bruising, lesion or rash.   Neurological:      General: No focal deficit present.      Mental Status: She is alert.         Results Reviewed       Procedure Component Value Units Date/Time    FLU/COVID Rapid Antigen (30 min. TAT) - Preferred screening test in ED [[id number]]  (Normal) Collected: 01/13/25 2116    Lab Status: Final result Specimen: Nares from Nose Updated:  01/13/25 2206     SARS COV Rapid Antigen Negative     Influenza A Rapid Antigen Negative     Influenza B Rapid Antigen Negative    Narrative:      This test has been performed using the Quidel Danuta 2 FLU+SARS Antigen test under the Emergency Use Authorization (EUA). This test has been validated by the  and verified by the performing laboratory. The Danuta uses lateral flow immunofluorescent sandwich assay to detect SARS-COV, Influenza A and Influenza B Antigen.     The Quidel Danuta 2 SARS Antigen test does not differentiate between SARS-CoV and SARS-CoV-2.     Negative results are presumptive and may be confirmed with a molecular assay, if necessary, for patient management. Negative results do not rule out SARS-CoV-2 or influenza infection and should not be used as the sole basis for treatment or patient management decisions. A negative test result may occur if the level of antigen in a sample is below the limit of detection of this test.     Positive results are indicative of the presence of viral antigens, but do not rule out bacterial infection or co-infection with other viruses.     All test results should be used as an adjunct to clinical observations and other information available to the provider.    FOR PEDIATRIC PATIENTS - copy/paste COVID Guidelines URL to browser: [email or web address]            No orders to display       Procedures    ED Medication and Procedure Management   Prior to Admission Medications   Prescriptions Last Dose Informant Patient Reported? Taking?   valACYclovir (VALTREX) 1,000 mg tablet   No No   Sig: Take 2 tablets (2,000 mg total) by mouth 2 (two) times a day for 1 day      Facility-Administered Medications: None     Discharge Medication List as of 1/13/2025 10:14 PM        CONTINUE these medications which have NOT CHANGED    Details   valACYclovir (VALTREX) 1,000 mg tablet Take 2 tablets (2,000 mg total) by mouth 2  (two) times a day for 1 day, Starting Mon 8/19/2024, Until Tue 8/20/2024, Normal           No discharge procedures on file.  ED SEPSIS DOCUMENTATION   Time reflects when diagnosis was documented in both MDM as applicable and the Disposition within this note       Time User Action Codes Description Comment    1/13/2025 10:13 PM Arnol Knight Add [B34.9] Viral syndrome                  Arnol Knight MD  01/13/25 3879

## 2025-01-14 NOTE — ED ATTENDING ATTESTATION
1/13/2025  I, Trish Garza MD, saw and evaluated the patient. I have discussed the patient with the resident/non-physician practitioner and agree with the resident's/non-physician practitioner's findings, Plan of Care, and MDM as documented in the resident's/non-physician practitioner's note, except where noted. All available labs and Radiology studies were reviewed.  I was present for key portions of any procedure(s) performed by the resident/non-physician practitioner and I was immediately available to provide assistance.       At this point I agree with the current assessment done in the Emergency Department.  I have conducted an independent evaluation of this patient a history and physical is as follows:    34-year-old female presenting for evaluation of bodyaches.  Patient is here with her sons.  One of her sons tested positive for COVID.  She otherwise denies any fevers.  Denies chest pain, shortness of breath, cough, congestion, abdominal pain, nausea, or vomiting, or diarrhea.    Physical exam:  Vital signs reviewed, within normal limits.  Patient is awake and alert, no acute distress, head normocephalic, atraumatic, mucous membranes moist, neck supple, heart regular rate and rhythm, no murmurs/rubs/gallops, lungs clear to auscultation bilaterally, abdomen soft, non tender, non distended, no rebound or guarding, no peripheral edema, no skin rashes, no focal neurologic deficits.     Assessment/plan:  34-year-old female presenting for evaluation of bodyaches.    Patient is here with her son as well as have similar symptoms, one of her sons tested positive for COVID.  Likely viral syndrome.  Will obtain viral swab.  Will treat symptomatically with Tylenol.  Strict return precautions discussed.  Patient expressed understanding and was agreeable for discharge.    ED Course         Critical Care Time  Procedures

## 2025-01-14 NOTE — DISCHARGE INSTRUCTIONS
Recommend symptomatic treatment with Tylenol and ibuprofen for treatment of fevers and bodyaches.    Ensure you are drinking plenty of fluids to prevent dehydration.    Follow-up with the family doctor as needed for reassessment and management of symptoms.    Thank for allowing us take part in your care.

## 2025-01-30 ENCOUNTER — HOSPITAL ENCOUNTER (EMERGENCY)
Facility: HOSPITAL | Age: 35
Discharge: HOME | End: 2025-01-30
Payer: COMMERCIAL

## 2025-01-30 VITALS
RESPIRATION RATE: 18 BRPM | HEART RATE: 79 BPM | OXYGEN SATURATION: 97 % | SYSTOLIC BLOOD PRESSURE: 126 MMHG | DIASTOLIC BLOOD PRESSURE: 86 MMHG | TEMPERATURE: 98.2 F

## 2025-01-30 DIAGNOSIS — R68.89 FLU-LIKE SYMPTOMS: ICD-10-CM

## 2025-01-30 DIAGNOSIS — R52 BODY ACHES: ICD-10-CM

## 2025-01-30 DIAGNOSIS — R19.7 DIARRHEA: Primary | ICD-10-CM

## 2025-01-30 PROCEDURE — 99283 EMERGENCY DEPT VISIT LOW MDM: CPT

## 2025-01-30 PROCEDURE — 87811 SARS-COV-2 COVID19 W/OPTIC: CPT

## 2025-01-30 PROCEDURE — 87804 INFLUENZA ASSAY W/OPTIC: CPT

## 2025-01-30 PROCEDURE — 99284 EMERGENCY DEPT VISIT MOD MDM: CPT

## 2025-01-30 RX ORDER — ONDANSETRON 4 MG/1
4 TABLET, FILM COATED ORAL EVERY 6 HOURS
Qty: 12 TABLET | Refills: 0 | Status: SHIPPED | OUTPATIENT
Start: 2025-01-30

## 2025-01-30 NOTE — Clinical Note
Dayanara Orantes was seen and treated in our emergency department on 1/30/2025.                Diagnosis:     Dayanara  is off the rest of the shift today, may return to work on return date.    She may return on this date: 02/02/2025    Dayanara is having infectious symptoms and should be excused from work until she has been diarrhea and fever free for 24 hours without medication.     If you have any questions or concerns, please don't hesitate to call.      Tico Gonzales MD    ______________________________           _______________          _______________  Hospital Representative                              Date                                Time

## 2025-01-31 NOTE — DISCHARGE INSTRUCTIONS
Make sure you are drinking plenty of fluids.  Getting lots of rest.  You should be out of work until you have been diarrhea free for 24 hours.  As we discussed, no Imodium within the first 3 days of illness as this can make your symptoms last longer.  Come back to the emergency department if you are feeling severe abdominal pain, or if you are feeling dehydrated.

## 2025-01-31 NOTE — ED PROVIDER NOTES
Time reflects when diagnosis was documented in both MDM as applicable and the Disposition within this note       Time User Action Codes Description Comment    1/30/2025 11:30 PM Tico Gonzales Add [R19.7] Diarrhea     1/30/2025 11:30 PM Tico Gonzales Add [R52] Body aches     1/30/2025 11:30 PM Tico Gonzales Add [R68.89] Flu-like symptoms           ED Disposition       ED Disposition   Discharge    Condition   Stable    Date/Time   Thu Jan 30, 2025 11:30 PM    Comment   Dayanara Orantes discharge to home/self care.                   Assessment & Plan       Medical Decision Making  Medical complexity: This is a 34-year-old female who is presenting with 1 day of diarrhea body aches and fatigue.  Patient reports that family members have been sick at home recently as well, her son tested positive for COVID last week.  Patient does have some mild congestion but otherwise no real upper respiratory type infectious symptoms.  Will screen viral panel of COVID/flu antigen test.  Otherwise, treatment at this time would be supportive in nature as patient has no red flags on assessment as she is having a nontender abdominal examination with normal vital signs and no signs of dehydration.  Patient does not have significant risk factors for opportunistic infection.    Reassessment/disposition: Patient was discharged with normal range vital signs, in no acute distress, ambulatory at her baseline, tolerating p.o.    Amount and/or Complexity of Data Reviewed  Labs: ordered.    Risk  Prescription drug management.             Medications - No data to display    ED Risk Strat Scores                          SBIRT 22yo+      Flowsheet Row Most Recent Value   Initial Alcohol Screen: US AUDIT-C     1. How often do you have a drink containing alcohol? 0 Filed at: 01/30/2025 2317   2. How many drinks containing alcohol do you have on a typical day you are drinking?  0 Filed at: 01/30/2025 2317   3b. FEMALE Any Age, or MALE 65+: How  "often do you have 4 or more drinks on one occassion? 0 Filed at: 01/30/2025 2317   Audit-C Score 0 Filed at: 01/30/2025 2317   SAMMI: How many times in the past year have you...    Used an illegal drug or used a prescription medication for non-medical reasons? Never Filed at: 01/30/2025 2317                            History of Present Illness       Chief Complaint   Patient presents with    Diarrhea     [place] headache since yesterday. Denies fevers,N+V       Past Medical History:   Diagnosis Date    Hypertension       Past Surgical History:   Procedure Laterality Date    BACK SURGERY        Family History   Problem Relation Age of Onset    Hypertension Mother     Hypertension Father       Social History     Tobacco Use    Smoking status: Never    Smokeless tobacco: Never   Vaping Use    Vaping status: Never Used   Substance Use Topics    Alcohol use: Yes     Comment: occasionally    Drug use: Not Currently      E-Cigarette/Vaping    E-Cigarette Use Never User       E-Cigarette/Vaping Substances    Nicotine No     THC No     CBD No     Flavoring No     Other No     Unknown No       I have reviewed and agree with the history as documented.     This is a 34-year-old female who is denying relevant past medical history but is presenting today with 1 day of diarrhea, body aches, and generalized fatigue.  She reports that multiple family members have been sick with similar symptoms at home.  Her diarrhea is described as \"watery\".  Patient has not been on antibiotics recently.  She denies risk factors for severe opportunistic infections.  Patient is not having significant abdominal pain.  She denies any vomiting but does endorse some nausea.  Patient was at work tonight when her symptoms began and was sent home from work.  She did take a diatome which was given to her by her employee health        Review of Systems   Constitutional:  Positive for fatigue. Negative for chills and fever.   HENT:  Negative for ear pain and " sore throat.    Eyes:  Negative for pain and visual disturbance.   Respiratory:  Negative for cough and shortness of breath.    Cardiovascular:  Negative for chest pain and palpitations.   Gastrointestinal:  Positive for diarrhea and nausea. Negative for abdominal pain, constipation and vomiting.   Genitourinary:  Negative for dysuria and hematuria.   Musculoskeletal:  Positive for arthralgias and myalgias. Negative for back pain.   Skin:  Negative for color change and rash.   Neurological:  Positive for weakness. Negative for seizures and syncope.   All other systems reviewed and are negative.          Objective       ED Triage Vitals   Temperature Pulse Blood Pressure Respirations SpO2 Patient Position - Orthostatic VS   01/30/25 2317 01/30/25 2317 01/30/25 2317 01/30/25 2317 01/30/25 2317 01/30/25 2330   98 °F (36.7 °C) 81 129/86 19 98 % Lying      Temp Source Heart Rate Source BP Location FiO2 (%) Pain Score    01/30/25 2330 01/30/25 2317 01/30/25 2330 -- 01/30/25 2317    Temporal Monitor Right arm  8      Vitals      Date and Time Temp Pulse SpO2 Resp BP Pain Score FACES Pain Rating User   01/30/25 2330 98.2 °F (36.8 °C) 79 97 % 18 126/86 8 -- CP   01/30/25 2317 98 °F (36.7 °C) 81 98 % 19 129/86 8 -- CP            Physical Exam  Vitals and nursing note reviewed.   Constitutional:       General: She is not in acute distress.     Appearance: She is well-developed and normal weight.   HENT:      Head: Normocephalic and atraumatic.      Right Ear: External ear normal.      Left Ear: External ear normal.      Nose: Nose normal. No congestion or rhinorrhea.      Mouth/Throat:      Mouth: Mucous membranes are moist.      Pharynx: Oropharynx is clear. No oropharyngeal exudate or posterior oropharyngeal erythema.   Eyes:      General: No scleral icterus.     Extraocular Movements: Extraocular movements intact.      Conjunctiva/sclera: Conjunctivae normal.      Pupils: Pupils are equal, round, and reactive to light.    Cardiovascular:      Rate and Rhythm: Normal rate and regular rhythm.      Pulses: Normal pulses.      Heart sounds: Normal heart sounds. No murmur heard.  Pulmonary:      Effort: Pulmonary effort is normal. No respiratory distress.      Breath sounds: Normal breath sounds. No wheezing or rhonchi.   Abdominal:      General: Abdomen is flat. There is no distension.      Palpations: Abdomen is soft.      Tenderness: There is no abdominal tenderness. There is no guarding.   Musculoskeletal:         General: No swelling.      Cervical back: Neck supple. No rigidity.      Right lower leg: No edema.      Left lower leg: No edema.   Lymphadenopathy:      Cervical: No cervical adenopathy.   Skin:     General: Skin is warm and dry.      Capillary Refill: Capillary refill takes less than 2 seconds.      Coloration: Skin is not jaundiced.      Findings: No rash.   Neurological:      General: No focal deficit present.      Mental Status: She is alert and oriented to person, place, and time. Mental status is at baseline.   Psychiatric:         Mood and Affect: Mood normal.         Behavior: Behavior normal.         Results Reviewed       Procedure Component Value Units Date/Time    FLU/COVID Rapid Antigen (30 min. TAT) - Preferred screening test in ED [[id number]] Collected: 01/30/25 2332    Lab Status: In process Specimen: Nares from Nose Updated: 01/30/25 2336            No orders to display       Procedures    ED Medication and Procedure Management   Prior to Admission Medications   Prescriptions Last Dose Informant Patient Reported? Taking?   valACYclovir (VALTREX) 1,000 mg tablet   No No   Sig: Take 2 tablets (2,000 mg total) by mouth 2 (two) times a day for 1 day      Facility-Administered Medications: None     Discharge Medication List as of 1/30/2025 11:32 PM        CONTINUE these medications which have NOT CHANGED    Details   valACYclovir (VALTREX) 1,000 mg tablet Take 2 tablets (2,000 mg total) by mouth 2 (two) times  a day for 1 day, Starting Mon 8/19/2024, Until Tue 8/20/2024, Normal           No discharge procedures on file.  ED SEPSIS DOCUMENTATION   Time reflects when diagnosis was documented in both MDM as applicable and the Disposition within this note       Time User Action Codes Description Comment    1/30/2025 11:30 PM Tico Gonzales [R19.7] Diarrhea     1/30/2025 11:30 PM Tico Gonzales [R52] Body aches     1/30/2025 11:30 PM Tico Gonzales [R68.89] Flu-like symptoms                  Tico Gonzales MD  01/30/25 [id number]

## 2025-03-13 ENCOUNTER — HOSPITAL ENCOUNTER (EMERGENCY)
Facility: HOSPITAL | Age: 35
Discharge: HOME | End: 2025-03-13
Attending: EMERGENCY MEDICINE
Payer: COMMERCIAL

## 2025-03-13 VITALS
TEMPERATURE: 98.3 F | OXYGEN SATURATION: 97 % | DIASTOLIC BLOOD PRESSURE: 104 MMHG | RESPIRATION RATE: 18 BRPM | BODY MASS INDEX: 28.31 KG/M2 | WEIGHT: 164.9 LBS | HEART RATE: 73 BPM | SYSTOLIC BLOOD PRESSURE: 157 MMHG

## 2025-03-13 DIAGNOSIS — N92.0 MENORRHAGIA WITH REGULAR CYCLE: Primary | ICD-10-CM

## 2025-03-13 LAB
ANION GAP SERPL CALCULATED.3IONS-SCNC: 9 MMOL/L (ref 4–13)
BASOPHILS # BLD AUTO: 0.04 THOUSANDS/ÂΜL (ref 0–0.1)
BASOPHILS NFR BLD AUTO: 1 % (ref 0–1)
BUN SERPL-MCNC: 16 MG/DL (ref 5–25)
CALCIUM SERPL-MCNC: 9.5 MG/DL (ref 8.4–10.2)
CHLORIDE SERPL-SCNC: 102 MMOL/L (ref 96–108)
CO2 SERPL-SCNC: 26 MMOL/L (ref 21–32)
CREAT SERPL-MCNC: 0.61 MG/DL (ref 0.6–1.3)
EOSINOPHIL # BLD AUTO: 0.16 THOUSAND/ÂΜL (ref 0–0.61)
EOSINOPHIL NFR BLD AUTO: 3 % (ref 0–6)
ERYTHROCYTE [DISTWIDTH] IN BLOOD BY AUTOMATED COUNT: 12.4 % (ref 11.6–15.1)
GFR SERPL CREATININE-BSD FRML MDRD: 118 ML/MIN/1.73SQ M
GLUCOSE SERPL-MCNC: 89 MG/DL (ref 65–140)
HCT VFR BLD AUTO: 40 % (ref 34.8–46.1)
HGB BLD-MCNC: 12.7 G/DL (ref 11.5–15.4)
IMM GRANULOCYTES # BLD AUTO: 0.01 THOUSAND/UL (ref 0–0.2)
IMM GRANULOCYTES NFR BLD AUTO: 0 % (ref 0–2)
LYMPHOCYTES # BLD AUTO: 2.05 THOUSANDS/ÂΜL (ref 0.6–4.47)
LYMPHOCYTES NFR BLD AUTO: 36 % (ref 14–44)
MCH RBC QN AUTO: 28.5 PG (ref 26.8–34.3)
MCHC RBC AUTO-ENTMCNC: 31.8 G/DL (ref 31.4–37.4)
MCV RBC AUTO: 90 FL (ref 82–98)
MONOCYTES # BLD AUTO: 0.48 THOUSAND/ÂΜL (ref 0.17–1.22)
MONOCYTES NFR BLD AUTO: 8 % (ref 4–12)
NEUTROPHILS # BLD AUTO: 3.04 THOUSANDS/ÂΜL (ref 1.85–7.62)
NEUTS SEG NFR BLD AUTO: 52 % (ref 43–75)
NRBC BLD AUTO-RTO: 0 /100 WBCS
PLATELET # BLD AUTO: 321 THOUSANDS/UL (ref 149–390)
PMV BLD AUTO: 9.3 FL (ref 8.9–12.7)
POTASSIUM SERPL-SCNC: 3.8 MMOL/L (ref 3.5–5.3)
RBC # BLD AUTO: 4.46 MILLION/UL (ref 3.81–5.12)
SODIUM SERPL-SCNC: 137 MMOL/L (ref 135–147)
TSH SERPL DL<=0.05 MIU/L-ACNC: 1.72 UIU/ML (ref 0.45–4.5)
WBC # BLD AUTO: 5.78 THOUSAND/UL (ref 4.31–10.16)

## 2025-03-13 PROCEDURE — 84443 ASSAY THYROID STIM HORMONE: CPT | Performed by: EMERGENCY MEDICINE

## 2025-03-13 PROCEDURE — 36415 COLL VENOUS BLD VENIPUNCTURE: CPT | Performed by: EMERGENCY MEDICINE

## 2025-03-13 PROCEDURE — 99284 EMERGENCY DEPT VISIT MOD MDM: CPT | Performed by: EMERGENCY MEDICINE

## 2025-03-13 PROCEDURE — 80048 BASIC METABOLIC PNL TOTAL CA: CPT | Performed by: EMERGENCY MEDICINE

## 2025-03-13 PROCEDURE — 85025 COMPLETE CBC W/AUTO DIFF WBC: CPT | Performed by: EMERGENCY MEDICINE

## 2025-03-13 PROCEDURE — 99283 EMERGENCY DEPT VISIT LOW MDM: CPT

## 2025-03-13 NOTE — ED NOTES
"Patient approached this RN in the hallway, stating \"I already had my bloodwork done and I need to pick-up one of my kids, I can't have him waiting any longer. I can come back for my work note.\" Patient exited department tat this time. Provider made aware regarding patient leaving and regarding work note.     Beata Lott, RN  03/13/25 1938    "

## 2025-03-13 NOTE — ED PROVIDER NOTES
Time reflects when diagnosis was documented in both MDM as applicable and the Disposition within this note       Time User Action Codes Description Comment    3/13/2025  6:07 PM Hardik Ramirez Add [N92.0] Menorrhagia with regular cycle           ED Disposition       ED Disposition   Left from Room after Provider Exam    Condition   --    Date/Time   Thu Mar 13, 2025  6:07 PM    Comment   --             Assessment & Plan       Medical Decision Making  Recurrent menorrhagia over multiple months at this point with quite severe bleeding in the mid phase of her menstruation that is functionally limiting without patient describing signs or symptoms suggestive of endorgan dysfunction or significant anemia. Given the recurrent nature of the symptoms, it is entirely reasonable to check CBC/BMP/TSH. Options would include different hormonal contraceptive (including a progesterone only agent) versus management with gynecologist such as endometrial ablation, etc.  Will check labs/thyroid function first and then discussed.  She is hemodynamically stable now and does not require any emergent intervention with respect to the symptoms at present.    Amount and/or Complexity of Data Reviewed  Labs: ordered. Decision-making details documented in ED Course.        ED Course as of 03/13/25 2000   Thu Mar 13, 2025   1805 CBC and differential  WBC wnl  Hg/Hct wnl  Plt wnl   1805 Patient left prior to me being able to reevaluate with her or discuss results of workup that had come back thus far, stating that she had to  her child.  She had requested a work note but otherwise was comfortable with plan for return to work especially given that she had follow-up scheduled with her gynecologist.  Given that she is hemodynamically stable and (now) has been found ot have normal hemoglobin, I would have agreed that this was a reasonable plan anyway and would have anticipated discharging the patient.  She will see her gynecologist which  is entirely appropriate.       Medications - No data to display    ED Risk Strat Scores                            SBIRT 22yo+      Flowsheet Row Most Recent Value   Initial Alcohol Screen: US AUDIT-C     3b. FEMALE Any Age, or MALE 65+: How often do you have 4 or more drinks on one occassion? 0 Filed at: 03/13/2025 1703   Audit-C Score 0 Filed at: 03/13/2025 1703                            History of Present Illness       Chief Complaint   Patient presents with    Vaginal Bleeding     Pt having heavy menstrual bleeding with clots       Past Medical History:   Diagnosis Date    Hypertension       Past Surgical History:   Procedure Laterality Date    BACK SURGERY        Family History   Problem Relation Age of Onset    Hypertension Mother     Hypertension Father       Social History     Tobacco Use    Smoking status: Never    Smokeless tobacco: Never   Vaping Use    Vaping status: Never Used   Substance Use Topics    Alcohol use: Yes     Comment: occasionally    Drug use: Not Currently      E-Cigarette/Vaping    E-Cigarette Use Never User       E-Cigarette/Vaping Substances    Nicotine No     THC No     CBD No     Flavoring No     Other No     Unknown No       I have reviewed and agree with the history as documented.     36 y/o woman with noted PMH; she presents to the ED due to menorrhagia occurring in a rather consistent fashion with menstrual periods for about the past 5-6 months.  She typically has 5-day cycle in which the first day has light bleeding, the 2nd-3rd days have extremely heavy bleeding (with large clots and bleeding requiring changing her pad 1 time per hour) with day 4/5 being light and then stopping thereafter.     Today is day 2 of her current period, with patient having heavy clots and bleeding requiring her to change the pad 1 time per hour.  She was unable to continue work today (works in an [employer]) due to the heavy bleeding.  Typically has severe cramping abdominal and back pain in  the week prior to menstruation which she treats with acetaminophen.  The pain resolves after the onset of her period; she does not have any abdominal/pelvic/back pain at present.    Does have a gynecologist whom she has already contacted regarding a follow-up appointment. Since the start of the symptoms, she denies any lightheadedness/exertional dyspnea/syncope.    She had been using Depo-Provera injections (3 total doses for a 9-month course of medication) up until about 6 months ago.  She discontinued the medication as she had had essentially continual spotting to light bleeding 15 out of 30 days each month when taking the medication.  She discontinued it entirely and does not currently take any hormonal contraceptive.  Prior to the Depo-Provera, she had had generally irregular periods most of her life.  No prior  surgery.  No history of unusually easy bleeding otherwise (epistaxis, unusual bruising, etc.).  Does not take any anticoagulant or antiplatelet medications.      History provided by:  Patient and medical records  Vaginal Bleeding  Associated symptoms: no abdominal pain, no nausea and no vaginal discharge        Review of Systems   Respiratory:  Negative for shortness of breath.    Cardiovascular:  Negative for palpitations.   Gastrointestinal:  Negative for abdominal distention, abdominal pain, nausea and vomiting.   Genitourinary:  Positive for menstrual problem, pelvic pain and vaginal bleeding. Negative for vaginal discharge and vaginal pain.   Neurological:  Negative for syncope, weakness and light-headedness.           Objective       ED Triage Vitals [03/13/25 1700]   Temperature Pulse Blood Pressure Respirations SpO2 Patient Position - Orthostatic VS   98.3 °F (36.8 °C) 73 (!) 157/104 18 97 % --      Temp Source Heart Rate Source BP Location FiO2 (%) Pain Score    Temporal Monitor -- -- 7      Vitals      Date and Time Temp Pulse SpO2 Resp BP Pain Score FACES Pain Rating User   03/13/25 1700  98.3 °F (36.8 °C) 73 97 % 18 157/104 7 -- KTR            Physical Exam  Vitals and nursing note reviewed.   Constitutional:       General: She is awake. She is not in acute distress.     Appearance: Normal appearance. She is well-developed.   HENT:      Head: Normocephalic and atraumatic.      Right Ear: Hearing and external ear normal.      Left Ear: Hearing and external ear normal.   Neck:      Thyroid: No thyroid mass, thyromegaly or thyroid tenderness.      Trachea: Trachea and phonation normal.   Cardiovascular:      Rate and Rhythm: Normal rate and regular rhythm.      Pulses:           Radial pulses are 2+ on the right side and 2+ on the left side.        Dorsalis pedis pulses are 2+ on the right side and 2+ on the left side.        Posterior tibial pulses are 2+ on the right side and 2+ on the left side.      Heart sounds: Normal heart sounds, S1 normal and S2 normal. No murmur heard.     No friction rub. No gallop.   Pulmonary:      Effort: Pulmonary effort is normal. No respiratory distress.      Breath sounds: Normal breath sounds. No stridor. No decreased breath sounds, wheezing, rhonchi or rales.   Abdominal:      General: There is no distension.      Palpations: There is no mass.      Tenderness: There is no abdominal tenderness. There is no guarding or rebound.   Skin:     General: Skin is warm and dry.   Neurological:      Mental Status: She is alert and oriented to person, place, and time.      GCS: GCS eye subscore is 4. GCS verbal subscore is 5. GCS motor subscore is 6.      Cranial Nerves: No cranial nerve deficit.      Sensory: No sensory deficit.      Motor: No abnormal muscle tone.      Comments: PERRLA; EOMI. Sensation intact to light touch over face in V1-V3 distribution bilaterally. Facial expressions symmetric. Tongue/uvula midline. Shoulder shrug equal bilaterally. Strength 5/5 in UE/LE bilaterally. Sensation intact to light touch in UE/LE bilaterally.         Results Reviewed        Procedure Component Value Units Date/Time    TSH [[id number]]  (Normal) Collected: 03/13/25 1743    Lab Status: Final result Specimen: Blood from Arm, Left Updated: 03/13/25 1821     TSH 3RD GENERATON 1.722 uIU/mL     Basic metabolic panel [[id number]] Collected: 03/13/25 1743    Lab Status: Final result Specimen: Blood from Arm, Left Updated: 03/13/25 1807     Sodium 137 mmol/L      Potassium 3.8 mmol/L      Chloride 102 mmol/L      CO2 26 mmol/L      ANION GAP 9 mmol/L      BUN 16 mg/dL      Creatinine 0.61 mg/dL      Glucose 89 mg/dL      Calcium 9.5 mg/dL      eGFR 118 ml/min/1.73sq m     Narrative:      National Kidney Disease Foundation guidelines for Chronic Kidney Disease (CKD):     Stage 1 with normal or high GFR (GFR > 90 mL/min/1.73 square meters)    Stage 2 Mild CKD (GFR = 60-89 mL/min/1.73 square meters)    Stage 3A Moderate CKD (GFR = 45-59 mL/min/1.73 square meters)    Stage 3B Moderate CKD (GFR = 30-44 mL/min/1.73 square meters)    Stage 4 Severe CKD (GFR = 15-29 mL/min/1.73 square meters)    Stage 5 End Stage CKD (GFR <15 mL/min/1.73 square meters)  Note: GFR calculation is accurate only with a steady state creatinine    CBC and differential [[id number]] Collected: 03/13/25 1743    Lab Status: Final result Specimen: Blood from Arm, Left Updated: 03/13/25 1750     WBC 5.78 Thousand/uL      RBC 4.46 Million/uL      Hemoglobin 12.7 g/dL      Hematocrit 40.0 %      MCV 90 fL      MCH 28.5 pg      MCHC 31.8 g/dL      RDW 12.4 %      MPV 9.3 fL      Platelets 321 Thousands/uL      nRBC 0 /100 WBCs      Segmented % 52 %      Immature Grans % 0 %      Lymphocytes % 36 %      Monocytes % 8 %      Eosinophils Relative 3 %      Basophils Relative 1 %      Absolute Neutrophils 3.04 Thousands/µL      Absolute Immature Grans 0.01 Thousand/uL      Absolute Lymphocytes 2.05 Thousands/µL      Absolute Monocytes 0.48 Thousand/µL      Eosinophils Absolute 0.16 Thousand/µL      Basophils Absolute 0.04 Thousands/µL              No orders to display       Procedures    ED Medication and Procedure Management   Prior to Admission Medications   Prescriptions Last Dose Informant Patient Reported? Taking?   ondansetron (ZOFRAN) 4 mg tablet   No No   Sig: Take 1 tablet (4 mg total) by mouth every 6 (six) hours   valACYclovir (VALTREX) 1,000 mg tablet   No No   Sig: Take 2 tablets (2,000 mg total) by mouth 2 (two) times a day for 1 day      Facility-Administered Medications: None     Discharge Medication List as of 3/13/2025  6:11 PM        CONTINUE these medications which have NOT CHANGED    Details   ondansetron (ZOFRAN) 4 mg tablet Take 1 tablet (4 mg total) by mouth every 6 (six) hours, Starting Thu 1/30/2025, Normal      valACYclovir (VALTREX) 1,000 mg tablet Take 2 tablets (2,000 mg total) by mouth 2 (two) times a day for 1 day, Starting Mon 8/19/2024, Until Tue 8/20/2024, Normal           No discharge procedures on file.  ED SEPSIS DOCUMENTATION   Time reflects when diagnosis was documented in both MDM as applicable and the Disposition within this note       Time User Action Codes Description Comment    3/13/2025  6:07 PM Hardik Ramirez Add [N92.0] Menorrhagia with regular cycle                  Hardik Ramirez DO  03/13/25 2001

## 2025-03-13 NOTE — Clinical Note
Dayanara Orantes was seen and treated in our emergency department on 3/13/2025.                Diagnosis:     Dayanara  may return to work on return date.    She may return on this date: 03/14/2025    Ms. Anand was seen in the [facility] emergency department on 13 March 2025; she was not able to complete her shift on 13 March due to illness.  She can return to work on 14 March.  Thank you.     If you have any questions or concerns, please don't hesitate to call.      Hardik Ramirez, DO    ______________________________           _______________          _______________  Hospital Representative                              Date                                Time

## 2025-03-13 NOTE — ED NOTES
Unable to obtain vitals prior to patient leaving the department.      Carol Ann Rowland RN  03/13/25 0048

## 2025-03-21 ENCOUNTER — OFFICE VISIT (OUTPATIENT)
Dept: URGENT CARE | Facility: MEDICAL CENTER | Age: 35
End: 2025-03-21
Payer: COMMERCIAL

## 2025-03-21 VITALS
SYSTOLIC BLOOD PRESSURE: 130 MMHG | HEART RATE: 85 BPM | RESPIRATION RATE: 16 BRPM | TEMPERATURE: 98.3 F | OXYGEN SATURATION: 96 % | BODY MASS INDEX: 27.81 KG/M2 | WEIGHT: 162 LBS | DIASTOLIC BLOOD PRESSURE: 78 MMHG

## 2025-03-21 DIAGNOSIS — J02.9 SORETHROAT: Primary | ICD-10-CM

## 2025-03-21 LAB — S PYO AG THROAT QL: NEGATIVE

## 2025-03-21 PROCEDURE — G0383 LEV 4 HOSP TYPE B ED VISIT: HCPCS

## 2025-03-21 PROCEDURE — 99284 EMERGENCY DEPT VISIT MOD MDM: CPT

## 2025-03-21 PROCEDURE — 87880 STREP A ASSAY W/OPTIC: CPT

## 2025-03-21 NOTE — PROGRESS NOTES
[facility]        NAME: Dayanara Orantes is a 35 y.o. female  : 1990    MRN: [id number]  DATE: 2025  TIME: 3:31 PM    Assessment and Plan   Sorethroat [J02.9]  1. Sorethroat  POCT rapid ANTIGEN strepA    Throat culture    Throat culture            Patient Instructions       Follow up with PCP in 3-5 days.  Proceed to  ER if symptoms worsen.    If tests are performed, our office will contact you with results only if changes need to made to the care plan discussed with you at the visit. You can review your full results on [facility].    Chief Complaint     Chief Complaint   Patient presents with   • Sore Throat     Began with sorethroat.lastnite.         History of Present Illness       Patient here with son who is also being seen for similar. Patient here with sore throat. Pain extends into the right ear. Pain with swallowing. Taking tylenol at home for her pain- last dose was this AM.         Review of Systems   Review of Systems   Constitutional:  Negative for chills, fatigue and fever.   HENT:  Positive for ear pain, rhinorrhea and sore throat. Negative for congestion, sinus pressure and sinus pain.    Respiratory:  Negative for cough and shortness of breath.    Cardiovascular:  Negative for chest pain and palpitations.   Gastrointestinal:  Negative for abdominal pain, constipation, diarrhea, nausea and vomiting.   Musculoskeletal:  Positive for myalgias. Negative for arthralgias and back pain.   Skin:  Negative for color change and rash.   Neurological:  Positive for headaches. Negative for seizures and syncope.   All other systems reviewed and are negative.        Current Medications       Current Outpatient Medications:   •  ondansetron (ZOFRAN) 4 mg tablet, Take 1 tablet (4 mg total) by mouth every 6 (six) hours (Patient not taking: Reported on 3/21/2025), Disp: 12 tablet, Rfl: 0  •  valACYclovir (VALTREX) 1,000 mg tablet, Take 2 tablets (2,000 mg total) by mouth 2  (two) times a day for 1 day, Disp: 4 tablet, Rfl: 0    Current Allergies     Allergies as of 03/21/2025   • (No Known Allergies)            The following portions of the patient's history were reviewed and updated as appropriate: allergies, current medications, past family history, past medical history, past social history, past surgical history and problem list.     Past Medical History:   Diagnosis Date   • Hypertension        Past Surgical History:   Procedure Laterality Date   • BACK SURGERY         Family History   Problem Relation Age of Onset   • Hypertension Mother    • Hypertension Father          Medications have been verified.        Objective   /78 (BP Location: Left arm, Patient Position: Sitting, Cuff Size: Standard)   Pulse 85   Temp 98.3 °F (36.8 °C) (Temporal)   Resp 16   Wt 73.5 kg (162 lb)   LMP 03/13/2025   SpO2 96%   BMI 27.81 kg/m²        Physical Exam     Physical Exam  Vitals and nursing note reviewed.   Constitutional:       General: She is not in acute distress.     Appearance: Normal appearance. She is normal weight. She is not ill-appearing.   HENT:      Head: Normocephalic and atraumatic.      Right Ear: Tympanic membrane, ear canal and external ear normal.      Left Ear: Tympanic membrane, ear canal and external ear normal.      Nose: Nose normal.      Mouth/Throat:      Lips: Pink.      Mouth: Mucous membranes are moist.      Pharynx: Uvula midline. Pharyngeal swelling and posterior oropharyngeal erythema present.      Tonsils: Tonsillar exudate present. 1+ on the right. 1+ on the left.   Eyes:      Extraocular Movements: Extraocular movements intact.      Conjunctiva/sclera: Conjunctivae normal.      Pupils: Pupils are equal, round, and reactive to light.   Cardiovascular:      Rate and Rhythm: Normal rate and regular rhythm.      Pulses: Normal pulses.      Heart sounds: Normal heart sounds.   Pulmonary:      Effort: Pulmonary effort is normal.      Breath sounds: Normal  breath sounds.   Abdominal:      General: Abdomen is flat. Bowel sounds are normal.      Palpations: Abdomen is soft.   Musculoskeletal:         General: Normal range of motion.      Cervical back: Full passive range of motion without pain, normal range of motion and neck supple.   Skin:     General: Skin is warm.      Capillary Refill: Capillary refill takes less than 2 seconds.      Findings: No rash.   Neurological:      General: No focal deficit present.      Mental Status: She is alert and oriented to person, place, and time.   Psychiatric:         Mood and Affect: Mood normal.         Behavior: Behavior normal.

## 2025-03-21 NOTE — LETTER
March 21, 2025     Patient: Dayanara Orantes   YOB: 1990   Date of Visit: 3/21/2025       To Whom it May Concern:    Dayanara Orantes was seen in my clinic on 3/21/2025. She may return to work on 03/22/2025 (or next scheduled day) provided she is fever free x24 hours without fever reducing medicines .    If you have any questions or concerns, please don't hesitate to call.         Sincerely,          WENDY Dhaliwal        CC: No Recipients

## 2025-05-30 ENCOUNTER — HOSPITAL ENCOUNTER (EMERGENCY)
Facility: HOSPITAL | Age: 35
Discharge: HOME | End: 2025-05-31
Attending: EMERGENCY MEDICINE | Admitting: EMERGENCY MEDICINE
Payer: COMMERCIAL

## 2025-05-30 DIAGNOSIS — R79.89 ELEVATED SERUM HCG: ICD-10-CM

## 2025-05-30 DIAGNOSIS — N89.8 VAGINAL DISCHARGE: ICD-10-CM

## 2025-05-30 DIAGNOSIS — N93.9 VAGINAL BLEEDING: Primary | ICD-10-CM

## 2025-05-30 DIAGNOSIS — R10.2 PELVIC CRAMPING: ICD-10-CM

## 2025-05-30 PROCEDURE — 99284 EMERGENCY DEPT VISIT MOD MDM: CPT

## 2025-05-30 NOTE — Clinical Note
Dayanara Oarntes was seen and treated in our emergency department on 5/30/2025.                Diagnosis: abdominal pain    Dayanara  .    She may return on this date: 06/01/2025         If you have any questions or concerns, please don't hesitate to call.      Jamie Foley MD    ______________________________           _______________          _______________  Hospital Representative                              Date                                Time

## 2025-05-31 ENCOUNTER — APPOINTMENT (EMERGENCY)
Dept: ULTRASOUND IMAGING | Facility: HOSPITAL | Age: 35
End: 2025-05-31
Payer: COMMERCIAL

## 2025-05-31 VITALS
SYSTOLIC BLOOD PRESSURE: 160 MMHG | BODY MASS INDEX: 27.66 KG/M2 | WEIGHT: 162.04 LBS | HEIGHT: 64 IN | OXYGEN SATURATION: 96 % | RESPIRATION RATE: 16 BRPM | HEART RATE: 82 BPM | DIASTOLIC BLOOD PRESSURE: 80 MMHG | TEMPERATURE: 99.9 F

## 2025-05-31 LAB
ANION GAP SERPL CALCULATED.3IONS-SCNC: 7 MMOL/L (ref 4–13)
B-HCG SERPL-ACNC: 19.8 MIU/ML (ref 0–5)
BASOPHILS # BLD AUTO: 0.06 THOUSANDS/ÂΜL (ref 0–0.1)
BASOPHILS NFR BLD AUTO: 1 % (ref 0–1)
BILIRUB UR QL STRIP: NEGATIVE
BUN SERPL-MCNC: 16 MG/DL (ref 5–25)
CALCIUM SERPL-MCNC: 9.2 MG/DL (ref 8.4–10.2)
CHLORIDE SERPL-SCNC: 103 MMOL/L (ref 96–108)
CLARITY UR: CLEAR
CO2 SERPL-SCNC: 27 MMOL/L (ref 21–32)
COLOR UR: YELLOW
CREAT SERPL-MCNC: 0.71 MG/DL (ref 0.6–1.3)
EOSINOPHIL # BLD AUTO: 0.14 THOUSAND/ÂΜL (ref 0–0.61)
EOSINOPHIL NFR BLD AUTO: 2 % (ref 0–6)
ERYTHROCYTE [DISTWIDTH] IN BLOOD BY AUTOMATED COUNT: 12.6 % (ref 11.6–15.1)
EXT PREGNANCY TEST URINE: NEGATIVE
EXT. CONTROL: NORMAL
GFR SERPL CREATININE-BSD FRML MDRD: 111 ML/MIN/1.73SQ M
GLUCOSE SERPL-MCNC: 106 MG/DL (ref 65–140)
GLUCOSE UR STRIP-MCNC: NEGATIVE MG/DL
HCT VFR BLD AUTO: 38.7 % (ref 34.8–46.1)
HGB BLD-MCNC: 12.5 G/DL (ref 11.5–15.4)
HGB UR QL STRIP.AUTO: NEGATIVE
IMM GRANULOCYTES # BLD AUTO: 0.01 THOUSAND/UL (ref 0–0.2)
IMM GRANULOCYTES NFR BLD AUTO: 0 % (ref 0–2)
KETONES UR STRIP-MCNC: NEGATIVE MG/DL
LEUKOCYTE ESTERASE UR QL STRIP: NEGATIVE
LYMPHOCYTES # BLD AUTO: 2.14 THOUSANDS/ÂΜL (ref 0.6–4.47)
LYMPHOCYTES NFR BLD AUTO: 35 % (ref 14–44)
MCH RBC QN AUTO: 28.8 PG (ref 26.8–34.3)
MCHC RBC AUTO-ENTMCNC: 32.3 G/DL (ref 31.4–37.4)
MCV RBC AUTO: 89 FL (ref 82–98)
MONOCYTES # BLD AUTO: 0.52 THOUSAND/ÂΜL (ref 0.17–1.22)
MONOCYTES NFR BLD AUTO: 9 % (ref 4–12)
NEUTROPHILS # BLD AUTO: 3.17 THOUSANDS/ÂΜL (ref 1.85–7.62)
NEUTS SEG NFR BLD AUTO: 53 % (ref 43–75)
NITRITE UR QL STRIP: NEGATIVE
NRBC BLD AUTO-RTO: 0 /100 WBCS
PH UR STRIP.AUTO: 7 [PH]
PLATELET # BLD AUTO: 312 THOUSANDS/UL (ref 149–390)
PMV BLD AUTO: 9.3 FL (ref 8.9–12.7)
POTASSIUM SERPL-SCNC: 3.9 MMOL/L (ref 3.5–5.3)
PROT UR STRIP-MCNC: NEGATIVE MG/DL
RBC # BLD AUTO: 4.34 MILLION/UL (ref 3.81–5.12)
SODIUM SERPL-SCNC: 137 MMOL/L (ref 135–147)
SP GR UR STRIP.AUTO: 1.02 (ref 1–1.03)
UROBILINOGEN UR STRIP-ACNC: <2 MG/DL
WBC # BLD AUTO: 6.04 THOUSAND/UL (ref 4.31–10.16)

## 2025-05-31 PROCEDURE — 87491 CHLMYD TRACH DNA AMP PROBE: CPT | Performed by: EMERGENCY MEDICINE

## 2025-05-31 PROCEDURE — 81025 URINE PREGNANCY TEST: CPT | Performed by: EMERGENCY MEDICINE

## 2025-05-31 PROCEDURE — 85025 COMPLETE CBC W/AUTO DIFF WBC: CPT | Performed by: EMERGENCY MEDICINE

## 2025-05-31 PROCEDURE — 81003 URINALYSIS AUTO W/O SCOPE: CPT | Performed by: EMERGENCY MEDICINE

## 2025-05-31 PROCEDURE — 76830 TRANSVAGINAL US NON-OB: CPT

## 2025-05-31 PROCEDURE — 76856 US EXAM PELVIC COMPLETE: CPT

## 2025-05-31 PROCEDURE — 84702 CHORIONIC GONADOTROPIN TEST: CPT | Performed by: EMERGENCY MEDICINE

## 2025-05-31 PROCEDURE — 96372 THER/PROPH/DIAG INJ SC/IM: CPT

## 2025-05-31 PROCEDURE — 99284 EMERGENCY DEPT VISIT MOD MDM: CPT | Performed by: EMERGENCY MEDICINE

## 2025-05-31 PROCEDURE — 87591 N.GONORRHOEAE DNA AMP PROB: CPT | Performed by: EMERGENCY MEDICINE

## 2025-05-31 PROCEDURE — 36415 COLL VENOUS BLD VENIPUNCTURE: CPT | Performed by: EMERGENCY MEDICINE

## 2025-05-31 PROCEDURE — 80048 BASIC METABOLIC PNL TOTAL CA: CPT | Performed by: EMERGENCY MEDICINE

## 2025-05-31 RX ORDER — METRONIDAZOLE 500 MG/1
500 TABLET ORAL EVERY 12 HOURS SCHEDULED
Qty: 20 TABLET | Refills: 0 | Status: SHIPPED | OUTPATIENT
Start: 2025-05-31 | End: 2025-06-10

## 2025-05-31 RX ORDER — NAPROXEN 500 MG/1
500 TABLET ORAL ONCE
Status: COMPLETED | OUTPATIENT
Start: 2025-05-31 | End: 2025-05-31

## 2025-05-31 RX ORDER — ONDANSETRON 4 MG/1
4 TABLET, ORALLY DISINTEGRATING ORAL EVERY 8 HOURS PRN
Qty: 21 TABLET | Refills: 0 | Status: SHIPPED | OUTPATIENT
Start: 2025-05-31

## 2025-05-31 RX ORDER — DOXYCYCLINE 100 MG/1
100 CAPSULE ORAL 2 TIMES DAILY
Qty: 20 CAPSULE | Refills: 0 | Status: SHIPPED | OUTPATIENT
Start: 2025-05-31 | End: 2025-06-10

## 2025-05-31 RX ORDER — ONDANSETRON 4 MG/1
4 TABLET, ORALLY DISINTEGRATING ORAL EVERY 8 HOURS PRN
Qty: 14 TABLET | Refills: 0 | Status: SHIPPED | OUTPATIENT
Start: 2025-05-31

## 2025-05-31 RX ORDER — DOXYCYCLINE 100 MG/1
100 CAPSULE ORAL ONCE
Status: COMPLETED | OUTPATIENT
Start: 2025-05-31 | End: 2025-05-31

## 2025-05-31 RX ORDER — NAPROXEN 500 MG/1
500 TABLET ORAL 2 TIMES DAILY WITH MEALS
Qty: 30 TABLET | Refills: 0 | Status: SHIPPED | OUTPATIENT
Start: 2025-05-31

## 2025-05-31 RX ORDER — LIDOCAINE HYDROCHLORIDE 10 MG/ML
INJECTION, SOLUTION EPIDURAL; INFILTRATION; INTRACAUDAL; PERINEURAL
Status: DISCONTINUED
Start: 2025-05-31 | End: 2025-05-31 | Stop reason: HOSPADM

## 2025-05-31 RX ORDER — METRONIDAZOLE 500 MG/1
500 TABLET ORAL ONCE
Status: COMPLETED | OUTPATIENT
Start: 2025-05-31 | End: 2025-05-31

## 2025-05-31 RX ADMIN — DOXYCYCLINE HYCLATE 100 MG: 100 CAPSULE ORAL at 03:53

## 2025-05-31 RX ADMIN — LIDOCAINE HYDROCHLORIDE 500 MG: 10 INJECTION, SOLUTION EPIDURAL; INFILTRATION; INTRACAUDAL; PERINEURAL at 03:53

## 2025-05-31 RX ADMIN — NAPROXEN 500 MG: 500 TABLET ORAL at 02:22

## 2025-05-31 RX ADMIN — METRONIDAZOLE 500 MG: 500 TABLET ORAL at 03:53

## 2025-05-31 NOTE — ED PROVIDER NOTES
Sign out         Signed out pending hcg    1.5 weeks ago medical .   Bleeding persists, now foul smelling dc  Pelvic cramping      Hcg comes back elev  Pelvic US shows hematoma vs RPOC    Well appearing    Return prec  Ceftriaxone doxy flagyl course    1 week US  Gyne f/u    Return w/ worsening     Jamie Foley MD  [phone]

## 2025-05-31 NOTE — DISCHARGE INSTRUCTIONS
Finish antibiotics    1 week get ultrasound    Follow with OB    If getting fevers, chills or worse discharge, come back

## 2025-05-31 NOTE — ED PROVIDER NOTES
Time reflects when diagnosis was documented in both MDM as applicable and the Disposition within this note       Time User Action Codes Description Comment    2025 12:54 AM Trish Garza Add [N93.9] Vaginal bleeding     2025 12:54 AM Trish Garza Add [N89.8] Vaginal discharge     2025  1:57 AM Jamie Foley [R79.89] Elevated serum hCG     2025  2:26 AM Jamie Foley [R10.2] Pelvic cramping           ED Disposition       ED Disposition   Discharge    Condition   Stable    Date/Time   Sat May 31, 2025  1:52 AM    Comment   Dayanara Orantes discharge to home/self care.                   Assessment & Plan       Medical Decision Making  Amount and/or Complexity of Data Reviewed  Labs: ordered.      35-year-old female presenting for evaluation of vaginal bleeding.  Patient had elective medical  approximately 10 days ago, having vaginal bleeding and lower abdominal cramping since then but now with malodorous vaginal discharge.  Will obtain molecular vaginal swab.  Will obtain UA to evaluate for UTI.  Will check urine pregnancy.  Will also check quantitative beta-hCG, CBC to evaluate for anemia, BMP to evaluate for metabolic abnormality.  If beta quant detectable, will proceed with ultrasound to evaluate for retained products of conception.  Signed out to Dr. Foley, pending beta quant and reassessment.           Medications   naproxen (NAPROSYN) tablet 500 mg (500 mg Oral Given 25 022)   cefTRIAXone (ROCEPHIN) 500 mg in lidocaine (PF) (XYLOCAINE-MPF) 1 % IM only syringe (500 mg Intramuscular Given 25)   doxycycline hyclate (VIBRAMYCIN) capsule 100 mg (100 mg Oral Given 25)   metroNIDAZOLE (FLAGYL) tablet 500 mg (500 mg Oral Given 25 0353)       ED Risk Strat Scores                    No data recorded        SBIRT 20yo+      Flowsheet Row Most Recent Value   Initial Alcohol Screen: US AUDIT-C     1. How often do you have a drink containing  alcohol? 0 Filed at: 2025   2. How many drinks containing alcohol do you have on a typical day you are drinking?  0 Filed at: 2025   3b. FEMALE Any Age, or MALE 65+: How often do you have 4 or more drinks on one occassion? 0 Filed at: 2025   Audit-C Score 0 Filed at: 2025   SAMMI: How many times in the past year have you...    Used an illegal drug or used a prescription medication for non-medical reasons? Never Filed at: 2025                            History of Present Illness       Chief Complaint   Patient presents with    Vaginal Bleeding     Pt presents with vaginal bleeding that is reddish/brown in color and odorous, Miscarriage on May 19, advised by provider to come to ED if bleeding continues       Past Medical History[1]   Past Surgical History[2]   Family History[3]   Social History[4]   E-Cigarette/Vaping    E-Cigarette Use Never User       E-Cigarette/Vaping Substances    Nicotine No     THC No     CBD No     Flavoring No     Other No     Unknown No       I have reviewed and agree with the history as documented.     HPI    35-year-old female presenting for evaluation of vaginal bleeding.  Patient had a medical  on .  She states she took a pill from the clinic.  She has been having vaginal bleeding since then.  She states vaginal bleeding was initially heavy but has gotten slightly lighter recently.  She states she started with some malodorous discharge today so presented to the emergency department.  She has been having some intermittent lower abdominal cramping.  She has had some nausea but no vomiting.  Denies urinary symptoms.  Denies fevers.  Denies chest pain, shortness of breath.  Has had some lightheadedness intermittently.     Review of Systems   Constitutional:  Negative for appetite change, chills and fever.   HENT:  Negative for congestion, rhinorrhea and sore throat.    Respiratory:  Negative for cough and shortness of breath.     Cardiovascular:  Negative for chest pain.   Gastrointestinal:  Positive for abdominal pain and nausea. Negative for constipation, diarrhea and vomiting.   Genitourinary:  Positive for vaginal bleeding and vaginal discharge. Negative for dysuria, frequency, hematuria and urgency.   Musculoskeletal:  Negative for arthralgias and myalgias.   Skin:  Negative for rash.   Neurological:  Negative for dizziness, weakness, light-headedness, numbness and headaches.   All other systems reviewed and are negative.          Objective       ED Triage Vitals [05/30/25 2358]   Temperature Pulse Blood Pressure Respirations SpO2 Patient Position - Orthostatic VS   99.3 °F (37.4 °C) 81 (!) 178/100 18 98 % Sitting      Temp Source Heart Rate Source BP Location FiO2 (%) Pain Score    Temporal Monitor Left arm -- 5      Vitals      Date and Time Temp Pulse SpO2 Resp BP Pain Score FACES Pain Rating User   05/31/25 0300 -- 82 96 % 16 160/80 6 -- SK   05/31/25 0222 -- -- -- -- -- 9 -- EZ   05/31/25 0000 99.9 °F (37.7 °C) 81 98 % 18 164/99 5 -- EZ   05/30/25 2358 99.3 °F (37.4 °C) 81 98 % 18 178/100 5 -- BB            Physical Exam  Vitals and nursing note reviewed.   Constitutional:       General: She is not in acute distress.     Appearance: Normal appearance. She is well-developed and normal weight. She is not ill-appearing, toxic-appearing or diaphoretic.   HENT:      Head: Normocephalic and atraumatic.      Right Ear: External ear normal.      Left Ear: External ear normal.      Nose: Nose normal.      Mouth/Throat:      Mouth: Mucous membranes are moist.      Pharynx: Oropharynx is clear.     Eyes:      Extraocular Movements: Extraocular movements intact.      Conjunctiva/sclera: Conjunctivae normal.       Cardiovascular:      Rate and Rhythm: Normal rate and regular rhythm.      Pulses: Normal pulses.      Heart sounds: Normal heart sounds. No murmur heard.     No friction rub. No gallop.   Pulmonary:      Effort: Pulmonary effort  is normal. No respiratory distress.      Breath sounds: Normal breath sounds. No wheezing or rales.   Abdominal:      General: There is no distension.      Palpations: Abdomen is soft.      Tenderness: There is abdominal tenderness. There is no right CVA tenderness, left CVA tenderness, guarding or rebound.      Comments: Mild suprapubic tenderness.     Musculoskeletal:         General: No tenderness.      Cervical back: Neck supple.      Right lower leg: No edema.      Left lower leg: No edema.     Skin:     General: Skin is warm and dry.      Coloration: Skin is not pale.      Findings: No erythema or rash.     Neurological:      General: No focal deficit present.      Mental Status: She is alert and oriented to person, place, and time.      Cranial Nerves: No cranial nerve deficit.      Sensory: No sensory deficit.      Motor: No weakness.     Psychiatric:         Mood and Affect: Mood normal.         Behavior: Behavior normal.         Results Reviewed       Procedure Component Value Units Date/Time    Chlamydia/GC amplified DNA by PCR [[id number]] Updated: 05/31/25 0117    Lab Status: In process     hCG, quantitative [[id number]]  (Abnormal) Collected: 05/31/25 0035    Lab Status: Final result Specimen: Blood from Arm, Left Updated: 05/31/25 0104     HCG, Quant 19.8 mIU/mL     Narrative:       Expected Ranges:    HCG results between 5.0 and 25.0 mIU/mL may be indicative of early pregnancy but should be interpreted in light of the total clinical presentation.    HCG can rise to detectable levels in karen and post menopausal women (0-11.6 mIU/mL).     Approximate               Approximate HCG  Gestation age          Concentration ( mIU/mL)  _____________          ______________________   Weeks                      HCG values  0.2-1                       5-50  1-2                           2-3                         100-5000  3-4                         500-75988  4-5                         1000-55435  5-6                          [id number]  6-8                         [id number]  8-12                        [id number]      Basic metabolic panel [[id number]] Collected: 05/31/25 0035    Lab Status: Final result Specimen: Blood from Arm, Left Updated: 05/31/25 0056     Sodium 137 mmol/L      Potassium 3.9 mmol/L      Chloride 103 mmol/L      CO2 27 mmol/L      ANION GAP 7 mmol/L      BUN 16 mg/dL      Creatinine 0.71 mg/dL      Glucose 106 mg/dL      Calcium 9.2 mg/dL      eGFR 111 ml/min/1.73sq m     Narrative:      [employer] Kid[employer] guidelines for Chronic Kidney Disease (CKD):     Stage 1 with normal or high GFR (GFR > 90 mL/min/1.73 square meters)    Stage 2 Mild CKD (GFR = 60-89 mL/min/1.73 square meters)    Stage 3A Moderate CKD (GFR = 45-59 mL/min/1.73 square meters)    Stage 3B Moderate CKD (GFR = 30-44 mL/min/1.73 square meters)    Stage 4 Severe CKD (GFR = 15-29 mL/min/1.73 square meters)    Stage 5 End Stage CKD (GFR <15 mL/min/1.73 square meters)  Note: GFR calculation is accurate only with a steady state creatinine    UA w Reflex to Microscopic w Reflex to Culture [[id number]] Collected: 05/31/25 0035    Lab Status: Final result Specimen: Urine, Clean Catch Updated: 05/31/25 0044     Color, UA Yellow     Clarity, UA Clear     Specific Gravity, UA 1.020     pH, UA 7.0     Leukocytes, UA Negative     Nitrite, UA Negative     Protein, UA Negative mg/dl      Glucose, UA Negative mg/dl      Ketones, UA Negative mg/dl      Urobilinogen, UA <2.0 mg/dl      Bilirubin, UA Negative     Occult Blood, UA Negative    CBC and differential [[id number]] Collected: 05/31/25 0035    Lab Status: Final result Specimen: Blood from Arm, Left Updated: 05/31/25 0043     WBC 6.04 Thousand/uL      RBC 4.34 Million/uL      Hemoglobin 12.5 g/dL      Hematocrit 38.7 %      MCV 89 fL      MCH 28.8 pg      MCHC 32.3 g/dL      RDW 12.6 %      MPV 9.3 fL      Platelets 312 Thousands/uL      nRBC 0 /100 WBCs      Segmented %  53 %      Immature Grans % 0 %      Lymphocytes % 35 %      Monocytes % 9 %      Eosinophils Relative 2 %      Basophils Relative 1 %      Absolute Neutrophils 3.17 Thousands/µL      Absolute Immature Grans 0.01 Thousand/uL      Absolute Lymphocytes 2.14 Thousands/µL      Absolute Monocytes 0.52 Thousand/µL      Eosinophils Absolute 0.14 Thousand/µL      Basophils Absolute 0.06 Thousands/µL     Molecular Vaginal Panel [[id number]] Collected: 05/31/25 0035    Lab Status: In process Specimen: Genital from Vaginal Updated: 05/31/25 0040    POCT pregnancy, urine [[id number]]  (Normal) Collected: 05/31/25 0035    Lab Status: Final result Updated: 05/31/25 0035     EXT Preg Test, Ur Negative     Control Valid            US pelvis complete w transvaginal   Final Interpretation by Landon Schreiber MD (05/31 0314)      Focal area of hypoechogenicity within the endometrium, without vascularity, favored to represent blood products. Avascular retained products is a possibility, however felt to be less likely. Recommend continued clinical follow-up with repeat ultrasound    in 3 to 7 days.      The study was marked in EPIC for significant notification.               Workstation performed: [id number]         US OB pregnancy limited with transvaginal    (Results Pending)       Procedures    ED Medication and Procedure Management   Prior to Admission Medications   Prescriptions Last Dose Informant Patient Reported? Taking?   ondansetron (ZOFRAN) 4 mg tablet   No No   Sig: Take 1 tablet (4 mg total) by mouth every 6 (six) hours   Patient not taking: Reported on 3/21/2025   valACYclovir (VALTREX) 1,000 mg tablet   No No   Sig: Take 2 tablets (2,000 mg total) by mouth 2 (two) times a day for 1 day      Facility-Administered Medications: None     Discharge Medication List as of 5/31/2025  3:32 AM        START taking these medications    Details   doxycycline hyclate (VIBRAMYCIN) 100 mg capsule Take 1 capsule (100 mg total) by mouth 2 (two)  times a day for 10 days, Starting Sat 5/31/2025, Until Tue 6/10/2025, Normal      metroNIDAZOLE (FLAGYL) 500 mg tablet Take 1 tablet (500 mg total) by mouth every 12 (twelve) hours for 10 days, Starting Sat 5/31/2025, Until Tue 6/10/2025, Normal      !! naproxen (Naprosyn) 500 mg tablet Take 1 tablet (500 mg total) by mouth 2 (two) times a day with meals, Starting Sat 5/31/2025, Normal      !! naproxen (Naprosyn) 500 mg tablet Take 1 tablet (500 mg total) by mouth 2 (two) times a day with meals, Starting Sat 5/31/2025, Normal      !! ondansetron (ZOFRAN-ODT) 4 mg disintegrating tablet Take 1 tablet (4 mg total) by mouth every 8 (eight) hours as needed for nausea or vomiting, Starting Sat 5/31/2025, Normal      !! ondansetron (ZOFRAN-ODT) 4 mg disintegrating tablet Take 1 tablet (4 mg total) by mouth every 8 (eight) hours as needed for nausea or vomiting, Starting Sat 5/31/2025, Normal       !! - Potential duplicate medications found. Please discuss with provider.        CONTINUE these medications which have NOT CHANGED    Details   ondansetron (ZOFRAN) 4 mg tablet Take 1 tablet (4 mg total) by mouth every 6 (six) hours, Starting Thu 1/30/2025, Normal      valACYclovir (VALTREX) 1,000 mg tablet Take 2 tablets (2,000 mg total) by mouth 2 (two) times a day for 1 day, Starting Mon 8/19/2024, Until Tue 8/20/2024, Normal           Outpatient Discharge Orders   US OB pregnancy limited with transvaginal   Standing Status: Future Standing Exp. Date: 05/31/29     ED SEPSIS DOCUMENTATION   Time reflects when diagnosis was documented in both MDM as applicable and the Disposition within this note       Time User Action Codes Description Comment    5/31/2025 12:54 AM DelaplaneTrish Hinojosa Add [N93.9] Vaginal bleeding     5/31/2025 12:54 AM Trish Garza Add [N89.8] Vaginal discharge     5/31/2025  1:57 AM Jamie Foley [R79.89] Elevated serum hCG     5/31/2025  2:26 AM Jamie Foley Add [R10.2] Pelvic cramping                       [1]   Past Medical History:  Diagnosis Date    Hypertension    [2]   Past Surgical History:  Procedure Laterality Date    BACK SURGERY     [3]   Family History  Problem Relation Name Age of Onset    Hypertension Mother      Hypertension Father     [4]   Social History  Tobacco Use    Smoking status: Never    Smokeless tobacco: Never   Vaping Use    Vaping status: Never Used   Substance Use Topics    Alcohol use: Yes     Comment: occasionally    Drug use: Not Currently        Trish Garza MD  05/31/25 0640

## 2025-06-01 LAB
C TRACH DNA SPEC QL NAA+PROBE: NEGATIVE
N GONORRHOEA DNA SPEC QL NAA+PROBE: NEGATIVE

## 2025-07-06 ENCOUNTER — HOSPITAL ENCOUNTER (EMERGENCY)
Facility: HOSPITAL | Age: 35
Discharge: HOME | End: 2025-07-06
Payer: COMMERCIAL

## 2025-07-06 VITALS
DIASTOLIC BLOOD PRESSURE: 111 MMHG | TEMPERATURE: 97 F | OXYGEN SATURATION: 98 % | RESPIRATION RATE: 18 BRPM | SYSTOLIC BLOOD PRESSURE: 186 MMHG | HEART RATE: 98 BPM

## 2025-07-06 DIAGNOSIS — T88.7XXA MEDICATION SIDE EFFECT: Primary | ICD-10-CM

## 2025-07-06 LAB
ANION GAP SERPL CALCULATED.3IONS-SCNC: 6 MMOL/L (ref 4–13)
ATRIAL RATE: 74 BPM
BASOPHILS # BLD AUTO: 0.04 THOUSANDS/ÂΜL (ref 0–0.1)
BASOPHILS NFR BLD AUTO: 0 % (ref 0–1)
BUN SERPL-MCNC: 10 MG/DL (ref 5–25)
CALCIUM SERPL-MCNC: 9.5 MG/DL (ref 8.4–10.2)
CHLORIDE SERPL-SCNC: 104 MMOL/L (ref 96–108)
CO2 SERPL-SCNC: 28 MMOL/L (ref 21–32)
CREAT SERPL-MCNC: 0.63 MG/DL (ref 0.6–1.3)
EOSINOPHIL # BLD AUTO: 0.21 THOUSAND/ÂΜL (ref 0–0.61)
EOSINOPHIL NFR BLD AUTO: 2 % (ref 0–6)
ERYTHROCYTE [DISTWIDTH] IN BLOOD BY AUTOMATED COUNT: 13.2 % (ref 11.6–15.1)
EXT PREGNANCY TEST URINE: NEGATIVE
EXT. CONTROL: NORMAL
GFR SERPL CREATININE-BSD FRML MDRD: 117 ML/MIN/1.73SQ M
GLUCOSE SERPL-MCNC: 112 MG/DL (ref 65–140)
HCT VFR BLD AUTO: 39.4 % (ref 34.8–46.1)
HGB BLD-MCNC: 12.6 G/DL (ref 11.5–15.4)
IMM GRANULOCYTES # BLD AUTO: 0.02 THOUSAND/UL (ref 0–0.2)
IMM GRANULOCYTES NFR BLD AUTO: 0 % (ref 0–2)
LYMPHOCYTES # BLD AUTO: 2.47 THOUSANDS/ÂΜL (ref 0.6–4.47)
LYMPHOCYTES NFR BLD AUTO: 27 % (ref 14–44)
MCH RBC QN AUTO: 28.6 PG (ref 26.8–34.3)
MCHC RBC AUTO-ENTMCNC: 32 G/DL (ref 31.4–37.4)
MCV RBC AUTO: 89 FL (ref 82–98)
MONOCYTES # BLD AUTO: 0.63 THOUSAND/ÂΜL (ref 0.17–1.22)
MONOCYTES NFR BLD AUTO: 7 % (ref 4–12)
NEUTROPHILS # BLD AUTO: 5.65 THOUSANDS/ÂΜL (ref 1.85–7.62)
NEUTS SEG NFR BLD AUTO: 64 % (ref 43–75)
NRBC BLD AUTO-RTO: 0 /100 WBCS
P AXIS: 36 DEGREES
PLATELET # BLD AUTO: 332 THOUSANDS/UL (ref 149–390)
PMV BLD AUTO: 9.4 FL (ref 8.9–12.7)
POTASSIUM SERPL-SCNC: 4 MMOL/L (ref 3.5–5.3)
PR INTERVAL: 174 MS
QRS AXIS: 17 DEGREES
QRSD INTERVAL: 90 MS
QT INTERVAL: 380 MS
QTC INTERVAL: 422 MS
RBC # BLD AUTO: 4.41 MILLION/UL (ref 3.81–5.12)
SODIUM SERPL-SCNC: 138 MMOL/L (ref 135–147)
T WAVE AXIS: 13 DEGREES
VENTRICULAR RATE: 74 BPM
WBC # BLD AUTO: 9.02 THOUSAND/UL (ref 4.31–10.16)

## 2025-07-06 PROCEDURE — 36415 COLL VENOUS BLD VENIPUNCTURE: CPT

## 2025-07-06 PROCEDURE — 93005 ELECTROCARDIOGRAM TRACING: CPT

## 2025-07-06 PROCEDURE — 99285 EMERGENCY DEPT VISIT HI MDM: CPT

## 2025-07-06 PROCEDURE — 81025 URINE PREGNANCY TEST: CPT

## 2025-07-06 PROCEDURE — 80048 BASIC METABOLIC PNL TOTAL CA: CPT

## 2025-07-06 PROCEDURE — 85025 COMPLETE CBC W/AUTO DIFF WBC: CPT

## 2025-07-06 PROCEDURE — 96360 HYDRATION IV INFUSION INIT: CPT

## 2025-07-06 PROCEDURE — 93010 ELECTROCARDIOGRAM REPORT: CPT | Performed by: INTERNAL MEDICINE

## 2025-07-06 PROCEDURE — 99283 EMERGENCY DEPT VISIT LOW MDM: CPT

## 2025-07-06 RX ADMIN — SODIUM CHLORIDE 500 ML: 0.9 INJECTION, SOLUTION INTRAVENOUS at 03:34

## 2025-07-06 NOTE — DISCHARGE INSTRUCTIONS
You should not return to work until you are feeling completely back to baseline.  This may take another 12 hours or so.  Make sure you are drinking plenty of fluids today to help eliminate the medication.  Only take medications that are prescribed to you.

## 2025-07-06 NOTE — Clinical Note
Dayanara Orantes was seen and treated in our emergency department on 7/6/2025.                Diagnosis:     Dayanara  is off the rest of the shift today.    She may return on this date:          If you have any questions or concerns, please don't hesitate to call.      Tico Gonzales MD    ______________________________           _______________          _______________  Hospital Representative                              Date                                Time

## 2025-07-06 NOTE — ED PROVIDER NOTES
Time reflects when diagnosis was documented in both MDM as applicable and the Disposition within this note       Time User Action Codes Description Comment    7/6/2025  3:54 AM Tico Gonzales Add [T88.7XXA] Medication side effect           ED Disposition       ED Disposition   Discharge    Condition   Stable    Date/Time   Sun Jul 6, 2025  3:54 AM    Comment   Dayanara Orantes discharge to home/self care.                   Assessment & Plan       Medical Decision Making  Medical complexity: 35-year-old female presenting to the ED today with concern for lightheadedness, weakness, fatigue, and heaviness all over after taking an unknown medication that supposedly is meant to treat migraines.  Unclear what the patient took, given her hypertension and tachycardia, warrants further workup and evaluation.  Toxidrome medically, she could meet anticholinergic type toxidrome.  She could also be very anxious.  Considering possibility of anemia, acute metabolic disturbances like hyperkalemia, hypokalemia, or arrhythmia as possibilities.  Will screen EKG, BMP, CBC, and provide some IV fluid rehydration while awaiting workup.    Reassessment/disposition: Thankfully, patient was feeling somewhat better after fluid bolus here in the emergency department.  She continues to demonstrate no sign of acute distress.  Her tachycardia resolved through her stay here in the ED however she remains hypertensive in a severe range.  She will follow-up with her primary doctor to have this rechecked.  She does not demonstrate any sign of acute endorgan dysfunction/hypertensive emergency.  Patient stable for discharge at this time.    ECG interpreted by myself.  Date: 7/6/2025.  Time: 0338.  Rate: 74 bpm.  Axis: Normal.  Rhythm: Regular.  Intervals are within normal limits as well.  There are no ST elevations or depressions evident on this ECG.  I see no sign of repolarization abnormality on this ECG except for a small T wave inversion in  "lead III.  This is a nonspecific repull abnormality.  There is poor R wave progression across the anterior leads noted.  Interpretation: Abnormal ECG with nonspecific findings. No acute ischemic changes.     Amount and/or Complexity of Data Reviewed  Labs: ordered.             Medications   sodium chloride 0.9 % bolus 500 mL (0 mL Intravenous Stopped 7/6/25 0420)       ED Risk Strat Scores                    No data recorded                            History of Present Illness       Chief Complaint   Patient presents with    Medication Problem     Pt. States that she was at work, had a headache and a co-worker gave her migraine medicine.  Pt. States, \"I feel weird, like numb all over.\"  Pt. Denies any CP, SOB, fevers.  Pt. Does not appear in any distress, VSS.        Past Medical History[1]   Past Surgical History[2]   Family History[3]   Social History[4]   E-Cigarette/Vaping    E-Cigarette Use Never User       E-Cigarette/Vaping Substances    Nicotine No     THC No     CBD No     Flavoring No     Other No     Unknown No       I have reviewed and agree with the history as documented.     This is a 35-year-old female who presents from her workplace due to concern for lightheadedness, dry mouth, jitteriness, and some nausea.  Patient reports that earlier in the evening she was at work and was experiencing a headache.  She asked a coworker if he had any medications that she could take.  The patient believes that she was given a prescription medication to treat migraines from her coworker.  She took the medication about 30 minutes later began to feel very jittery, lightheaded, weak, heavy, and \"numb all over\".  She states that she continues to feel significant lightheadedness, fatigue, and jitteriness.  She states that her headache did improve significantly however.  She states that otherwise she was feeling like her normal baseline state of health.  She presents to the emergency department with hypertension, and " tachycardia to the low 100s.  Patient denies any vomiting at this time.  She did see her workplace healthcare who advised her to come to the emergency department for further evaluation and treatment.        Review of Systems   Constitutional:  Positive for fatigue. Negative for chills and fever.   HENT:  Negative for ear pain and sore throat.    Eyes:  Negative for pain and visual disturbance.   Respiratory:  Negative for cough and shortness of breath.    Cardiovascular:  Negative for chest pain and palpitations.   Gastrointestinal:  Positive for nausea. Negative for abdominal pain and vomiting.   Genitourinary:  Negative for dysuria and hematuria.   Musculoskeletal:  Negative for arthralgias and back pain.   Skin:  Negative for color change and rash.   Neurological:  Positive for weakness, light-headedness and numbness. Negative for seizures and syncope.   All other systems reviewed and are negative.          Objective       ED Triage Vitals [07/06/25 0246]   Temperature Pulse Blood Pressure Respirations SpO2 Patient Position - Orthostatic VS   (!) 96.8 °F (36 °C) 101 (!) 187/122 18 97 % Sitting      Temp Source Heart Rate Source BP Location FiO2 (%) Pain Score    Temporal Monitor Left arm -- No Pain      Vitals      Date and Time Temp Pulse SpO2 Resp BP Pain Score FACES Pain Rating User   07/06/25 0425 97 °F (36.1 °C) -- -- -- -- -- --    07/06/25 0422 -- 98 98 % -- -- -- --    07/06/25 0412 -- 102 99 % -- 186/111 -- --    07/06/25 0247 -- -- -- -- -- No Pain --    07/06/25 0246 96.8 °F (36 °C) 101 97 % 18 187/122 No Pain -- SS            Physical Exam  Vitals and nursing note reviewed.   Constitutional:       General: She is not in acute distress.     Appearance: Normal appearance. She is well-developed.   HENT:      Head: Normocephalic and atraumatic.      Right Ear: External ear normal.      Left Ear: External ear normal.      Nose: Nose normal. No congestion or rhinorrhea.      Mouth/Throat:       Mouth: Mucous membranes are moist.      Pharynx: Oropharynx is clear. No oropharyngeal exudate or posterior oropharyngeal erythema.     Eyes:      General: No scleral icterus.     Extraocular Movements: Extraocular movements intact.      Conjunctiva/sclera: Conjunctivae normal.      Pupils: Pupils are equal, round, and reactive to light.       Cardiovascular:      Rate and Rhythm: Regular rhythm. Tachycardia present.      Pulses: Normal pulses.      Heart sounds: No murmur heard.  Pulmonary:      Effort: Pulmonary effort is normal. No respiratory distress.   Abdominal:      General: Abdomen is flat. There is no distension.     Musculoskeletal:         General: No swelling.      Cervical back: Normal range of motion. No rigidity.      Right lower leg: No edema.      Left lower leg: No edema.     Skin:     General: Skin is warm and dry.      Capillary Refill: Capillary refill takes less than 2 seconds.      Coloration: Skin is not jaundiced.      Findings: No rash.     Neurological:      General: No focal deficit present.      Mental Status: She is alert and oriented to person, place, and time. Mental status is at baseline.     Psychiatric:         Mood and Affect: Mood normal.         Behavior: Behavior normal.         Results Reviewed       Procedure Component Value Units Date/Time    Basic metabolic panel [[id number]] Collected: 07/06/25 0331    Lab Status: Final result Specimen: Blood from Arm, Left Updated: 07/06/25 0415     Sodium 138 mmol/L      Potassium 4.0 mmol/L      Chloride 104 mmol/L      CO2 28 mmol/L      ANION GAP 6 mmol/L      BUN 10 mg/dL      Creatinine 0.63 mg/dL      Glucose 112 mg/dL      Calcium 9.5 mg/dL      eGFR 117 ml/min/1.73sq m     Narrative:      [employer] guidelines for Chronic Kidney Disease (CKD):     Stage 1 with normal or high GFR (GFR > 90 mL/min/1.73 square meters)    Stage 2 Mild CKD (GFR = 60-89 mL/min/1.73 square meters)    Stage 3A Moderate CKD (GFR =  45-59 mL/min/1.73 square meters)    Stage 3B Moderate CKD (GFR = 30-44 mL/min/1.73 square meters)    Stage 4 Severe CKD (GFR = 15-29 mL/min/1.73 square meters)    Stage 5 End Stage CKD (GFR <15 mL/min/1.73 square meters)  Note: GFR calculation is accurate only with a steady state creatinine    CBC and differential [[id number]] Collected: 07/06/25 0331    Lab Status: Final result Specimen: Blood from Arm, Left Updated: 07/06/25 0347     WBC 9.02 Thousand/uL      RBC 4.41 Million/uL      Hemoglobin 12.6 g/dL      Hematocrit 39.4 %      MCV 89 fL      MCH 28.6 pg      MCHC 32.0 g/dL      RDW 13.2 %      MPV 9.4 fL      Platelets 332 Thousands/uL      nRBC 0 /100 WBCs      Segmented % 64 %      Immature Grans % 0 %      Lymphocytes % 27 %      Monocytes % 7 %      Eosinophils Relative 2 %      Basophils Relative 0 %      Absolute Neutrophils 5.65 Thousands/µL      Absolute Immature Grans 0.02 Thousand/uL      Absolute Lymphocytes 2.47 Thousands/µL      Absolute Monocytes 0.63 Thousand/µL      Eosinophils Absolute 0.21 Thousand/µL      Basophils Absolute 0.04 Thousands/µL     POCT pregnancy, urine [[id number]]  (Normal) Collected: 07/06/25 0333    Lab Status: Final result Updated: 07/06/25 0334     EXT Preg Test, Ur Negative     Control Valid            No orders to display       Procedures    ED Medication and Procedure Management   Prior to Admission Medications   Prescriptions Last Dose Informant Patient Reported? Taking?   naproxen (Naprosyn) 500 mg tablet   No No   Sig: Take 1 tablet (500 mg total) by mouth 2 (two) times a day with meals   naproxen (Naprosyn) 500 mg tablet   No No   Sig: Take 1 tablet (500 mg total) by mouth 2 (two) times a day with meals   ondansetron (ZOFRAN) 4 mg tablet   No No   Sig: Take 1 tablet (4 mg total) by mouth every 6 (six) hours   Patient not taking: Reported on 3/21/2025   ondansetron (ZOFRAN-ODT) 4 mg disintegrating tablet   No No   Sig: Take 1 tablet (4 mg total) by mouth every 8  (eight) hours as needed for nausea or vomiting   ondansetron (ZOFRAN-ODT) 4 mg disintegrating tablet   No No   Sig: Take 1 tablet (4 mg total) by mouth every 8 (eight) hours as needed for nausea or vomiting   valACYclovir (VALTREX) 1,000 mg tablet   No No   Sig: Take 2 tablets (2,000 mg total) by mouth 2 (two) times a day for 1 day      Facility-Administered Medications: None     Discharge Medication List as of 7/6/2025  4:20 AM        CONTINUE these medications which have NOT CHANGED    Details   !! naproxen (Naprosyn) 500 mg tablet Take 1 tablet (500 mg total) by mouth 2 (two) times a day with meals, Starting Sat 5/31/2025, Normal      !! naproxen (Naprosyn) 500 mg tablet Take 1 tablet (500 mg total) by mouth 2 (two) times a day with meals, Starting Sat 5/31/2025, Normal      ondansetron (ZOFRAN) 4 mg tablet Take 1 tablet (4 mg total) by mouth every 6 (six) hours, Starting Thu 1/30/2025, Normal      !! ondansetron (ZOFRAN-ODT) 4 mg disintegrating tablet Take 1 tablet (4 mg total) by mouth every 8 (eight) hours as needed for nausea or vomiting, Starting Sat 5/31/2025, Normal      !! ondansetron (ZOFRAN-ODT) 4 mg disintegrating tablet Take 1 tablet (4 mg total) by mouth every 8 (eight) hours as needed for nausea or vomiting, Starting Sat 5/31/2025, Normal      valACYclovir (VALTREX) 1,000 mg tablet Take 2 tablets (2,000 mg total) by mouth 2 (two) times a day for 1 day, Starting Mon 8/19/2024, Until Tue 8/20/2024, Normal       !! - Potential duplicate medications found. Please discuss with provider.        No discharge procedures on file.  ED SEPSIS DOCUMENTATION   Time reflects when diagnosis was documented in both MDM as applicable and the Disposition within this note       Time User Action Codes Description Comment    7/6/2025  3:54 AM Tico Gonzales Add [T88.7XXA] Medication side effect                    Tico Gonzales MD  07/06/25 0432         [1]   Past Medical History:  Diagnosis Date    Hypertension     [2]   Past Surgical History:  Procedure Laterality Date    BACK SURGERY     [3]   Family History  Problem Relation Name Age of Onset    Hypertension Mother      Hypertension Father     [4]   Social History  Tobacco Use    Smoking status: Never    Smokeless tobacco: Never   Vaping Use    Vaping status: Never Used   Substance Use Topics    Alcohol use: Yes     Comment: occasionally    Drug use: Not Currently        Tico Gonzales MD  07/06/25 0594

## 2025-07-17 ENCOUNTER — HOSPITAL ENCOUNTER (EMERGENCY)
Facility: HOSPITAL | Age: 35
Discharge: HOME | End: 2025-07-17
Attending: EMERGENCY MEDICINE
Payer: COMMERCIAL

## 2025-07-17 VITALS
TEMPERATURE: 97.5 F | HEART RATE: 67 BPM | OXYGEN SATURATION: 99 % | SYSTOLIC BLOOD PRESSURE: 155 MMHG | BODY MASS INDEX: 27.46 KG/M2 | WEIGHT: 160 LBS | DIASTOLIC BLOOD PRESSURE: 95 MMHG | RESPIRATION RATE: 18 BRPM

## 2025-07-17 DIAGNOSIS — N92.0 MENORRHAGIA: Primary | ICD-10-CM

## 2025-07-17 LAB
BACTERIA UR QL AUTO: ABNORMAL /HPF
BASOPHILS # BLD AUTO: 0.04 THOUSANDS/ÂΜL (ref 0–0.1)
BASOPHILS NFR BLD AUTO: 1 % (ref 0–1)
BILIRUB UR QL STRIP: NEGATIVE
CLARITY UR: CLEAR
COLOR UR: YELLOW
EOSINOPHIL # BLD AUTO: 0.22 THOUSAND/ÂΜL (ref 0–0.61)
EOSINOPHIL NFR BLD AUTO: 3 % (ref 0–6)
ERYTHROCYTE [DISTWIDTH] IN BLOOD BY AUTOMATED COUNT: 12.6 % (ref 11.6–15.1)
EXT PREGNANCY TEST URINE: NEGATIVE
EXT. CONTROL: NORMAL
GLUCOSE UR STRIP-MCNC: NEGATIVE MG/DL
HCT VFR BLD AUTO: 40.3 % (ref 34.8–46.1)
HGB BLD-MCNC: 13.1 G/DL (ref 11.5–15.4)
HGB UR QL STRIP.AUTO: ABNORMAL
IMM GRANULOCYTES # BLD AUTO: 0.01 THOUSAND/UL (ref 0–0.2)
IMM GRANULOCYTES NFR BLD AUTO: 0 % (ref 0–2)
KETONES UR STRIP-MCNC: NEGATIVE MG/DL
LEUKOCYTE ESTERASE UR QL STRIP: NEGATIVE
LYMPHOCYTES # BLD AUTO: 2.52 THOUSANDS/ÂΜL (ref 0.6–4.47)
LYMPHOCYTES NFR BLD AUTO: 36 % (ref 14–44)
MCH RBC QN AUTO: 28.6 PG (ref 26.8–34.3)
MCHC RBC AUTO-ENTMCNC: 32.5 G/DL (ref 31.4–37.4)
MCV RBC AUTO: 88 FL (ref 82–98)
MONOCYTES # BLD AUTO: 0.53 THOUSAND/ÂΜL (ref 0.17–1.22)
MONOCYTES NFR BLD AUTO: 8 % (ref 4–12)
MUCOUS THREADS UR QL AUTO: ABNORMAL
NEUTROPHILS # BLD AUTO: 3.76 THOUSANDS/ÂΜL (ref 1.85–7.62)
NEUTS SEG NFR BLD AUTO: 52 % (ref 43–75)
NITRITE UR QL STRIP: NEGATIVE
NON-SQ EPI CELLS URNS QL MICRO: ABNORMAL /HPF
NRBC BLD AUTO-RTO: 0 /100 WBCS
PH UR STRIP.AUTO: 5.5 [PH]
PLATELET # BLD AUTO: 276 THOUSANDS/UL (ref 149–390)
PMV BLD AUTO: 9.2 FL (ref 8.9–12.7)
PROT UR STRIP-MCNC: ABNORMAL MG/DL
RBC # BLD AUTO: 4.58 MILLION/UL (ref 3.81–5.12)
RBC #/AREA URNS AUTO: ABNORMAL /HPF
SP GR UR STRIP.AUTO: >=1.03 (ref 1–1.03)
UROBILINOGEN UR STRIP-ACNC: <2 MG/DL
WBC # BLD AUTO: 7.08 THOUSAND/UL (ref 4.31–10.16)
WBC #/AREA URNS AUTO: ABNORMAL /HPF

## 2025-07-17 PROCEDURE — 99283 EMERGENCY DEPT VISIT LOW MDM: CPT

## 2025-07-17 PROCEDURE — 36415 COLL VENOUS BLD VENIPUNCTURE: CPT | Performed by: EMERGENCY MEDICINE

## 2025-07-17 PROCEDURE — 99284 EMERGENCY DEPT VISIT MOD MDM: CPT | Performed by: EMERGENCY MEDICINE

## 2025-07-17 PROCEDURE — 81025 URINE PREGNANCY TEST: CPT | Performed by: EMERGENCY MEDICINE

## 2025-07-17 PROCEDURE — 85025 COMPLETE CBC W/AUTO DIFF WBC: CPT | Performed by: EMERGENCY MEDICINE

## 2025-07-17 PROCEDURE — 96374 THER/PROPH/DIAG INJ IV PUSH: CPT

## 2025-07-17 PROCEDURE — 81001 URINALYSIS AUTO W/SCOPE: CPT | Performed by: EMERGENCY MEDICINE

## 2025-07-17 PROCEDURE — 96361 HYDRATE IV INFUSION ADD-ON: CPT

## 2025-07-17 RX ORDER — IBUPROFEN 400 MG/1
400 TABLET, FILM COATED ORAL EVERY 6 HOURS PRN
Qty: 30 TABLET | Refills: 0 | Status: SHIPPED | OUTPATIENT
Start: 2025-07-17

## 2025-07-17 RX ORDER — KETOROLAC TROMETHAMINE 30 MG/ML
15 INJECTION, SOLUTION INTRAMUSCULAR; INTRAVENOUS ONCE
Status: COMPLETED | OUTPATIENT
Start: 2025-07-17 | End: 2025-07-17

## 2025-07-17 RX ADMIN — KETOROLAC TROMETHAMINE 15 MG: 30 INJECTION, SOLUTION INTRAMUSCULAR at 22:53

## 2025-07-17 RX ADMIN — SODIUM CHLORIDE 1000 ML: 0.9 INJECTION, SOLUTION INTRAVENOUS at 22:52

## 2025-07-17 NOTE — Clinical Note
Dayanara Jacksonkristopheralex was seen and treated in our emergency department on 7/17/2025.                Diagnosis:     Dayanara  .    She may return on this date:     Excused on 7/17, 7/18 for menorrhagia (heavy menstruation)     If you have any questions or concerns, please don't hesitate to call.      Tico Willams MD    ______________________________           _______________          _______________  Hospital Representative                              Date                                Time

## 2025-07-17 NOTE — Clinical Note
Dayanara LucianoIvankristopheralex was seen and treated in our emergency department on 7/17/2025.                Diagnosis:     Dayanara  .    She may return on this date:     Excused on 7/17, 7/18     If you have any questions or concerns, please don't hesitate to call.      Tico Willams MD    ______________________________           _______________          _______________  Hospital Representative                              Date                                Time

## 2025-07-18 NOTE — ED PROVIDER NOTES
Time reflects when diagnosis was documented in both MDM as applicable and the Disposition within this note       Time User Action Codes Description Comment    7/17/2025 11:17 PM Tico Willams Add [N92.0] Menorrhagia           ED Disposition       ED Disposition   Discharge    Condition   Stable    Date/Time   Thu Jul 17, 2025 11:17 PM    Comment   Dayanara Orantes discharge to home/self care.                   Assessment & Plan       Medical Decision Making  I reviewed the patient's medical chart, PMHx, prior encounters, medications.    My DDx includes: Menorrhagia.  At risk for miscarriage, ectopic pregnancy    Rule out UTI, pregnancy.  In the meantime we will hydrate with fluids, give Toradol.  Will check CBC to evaluate hemoglobin, urinalysis.  Reassess.    CBC shows no anemia, urinalysis was negative for infection.    Will provide PCP and gynecology referral.  Patient was discharged with strict return precautions.    Amount and/or Complexity of Data Reviewed  Labs: ordered. Decision-making details documented in ED Course.    Risk  Prescription drug management.        ED Course as of 07/19/25 2137   Thu Jul 17, 2025 2253 PREGNANCY TEST URINE: Negative  Negative preg   2306 Hemoglobin: 13.1  Normal hgb   2340 RBC Urine(!): 10-20  Consistent with her having her period, overall UA is less consistent with UTI.       Medications   ketorolac (TORADOL) injection 15 mg (15 mg Intravenous Given 7/17/25 2253)   sodium chloride 0.9 % bolus 1,000 mL (0 mL Intravenous Stopped 7/17/25 2333)       ED Risk Strat Scores                    No data recorded        SBIRT 20yo+      Flowsheet Row Most Recent Value   Initial Alcohol Screen: US AUDIT-C     1. How often do you have a drink containing alcohol? 0 Filed at: 07/17/2025 2226   2. How many drinks containing alcohol do you have on a typical day you are drinking?  0 Filed at: 07/17/2025 2226   3b. FEMALE Any Age, or MALE 65+: How often do you have 4 or more  drinks on one occassion? 0 Filed at: 07/17/2025 2226   Audit-C Score 0 Filed at: 07/17/2025 2226   SAMMI: How many times in the past year have you...    Used an illegal drug or used a prescription medication for non-medical reasons? Never Filed at: 07/17/2025 2226                            History of Present Illness       Chief Complaint   Patient presents with    Vaginal Bleeding     Patient presents to ED from home w/c/o heavy vaginal bleeding, her period started 2 days ago, she started w/headaches prior to period that have gotten better however she is bleeding through tampons and large overnight pads while at work, c/o dizziness and severe cramping       Past Medical History[1]   Past Surgical History[2]   Family History[3]   Social History[4]   E-Cigarette/Vaping    E-Cigarette Use Never User       E-Cigarette/Vaping Substances    Nicotine No     THC No     CBD No     Flavoring No     Other No     Unknown No       I have reviewed and agree with the history as documented.     35-year-old female who presents for vaginal bleeding.  Patient reports her period started 2 days ago, however has been very heavy, she has had lower abdominal cramping.  Denies any dysuria or frequency.  Patient states that it is unlikely that she is pregnant.  She does report some occasional nausea without vomiting.  She does report some bilateral lower back discomfort with the cramps.  Patient states that she is new to the area, does not have a gynecologist of her PCP, she would like referral for these.  ROS otherwise negative.        Review of Systems   Constitutional:  Negative for chills and fever.   HENT:  Negative for congestion, rhinorrhea and sore throat.    Respiratory:  Negative for cough and shortness of breath.    Cardiovascular:  Negative for chest pain and palpitations.   Gastrointestinal:  Positive for abdominal pain and nausea. Negative for anal bleeding, constipation, diarrhea and vomiting.   Genitourinary:  Positive for  menstrual problem. Negative for difficulty urinating and flank pain.   Musculoskeletal:  Negative for arthralgias.   Neurological:  Negative for dizziness, weakness, light-headedness and headaches.   Psychiatric/Behavioral:  Negative for agitation, behavioral problems and confusion.    All other systems reviewed and are negative.          Objective       ED Triage Vitals   Temperature Pulse Blood Pressure Respirations SpO2 Patient Position - Orthostatic VS   07/17/25 2225 07/17/25 2225 07/17/25 2225 07/17/25 2225 07/17/25 2225 07/17/25 2245   97.5 °F (36.4 °C) 73 (!) 198/112 18 100 % Lying      Temp Source Heart Rate Source BP Location FiO2 (%) Pain Score    07/17/25 2225 07/17/25 2245 07/17/25 2245 -- 07/17/25 2225    Temporal Monitor Left arm  10 - Worst Possible Pain      Vitals      Date and Time Temp Pulse SpO2 Resp BP Pain Score FACES Pain Rating User   07/17/25 2300 -- 67 99 % 18 155/95 7 -- KO   07/17/25 2253 -- -- -- -- -- 7 -- KO   07/17/25 2245 -- 69 99 % 16 173/98 7 -- KO   07/17/25 2226 -- -- 100 % -- -- -- -- LC   07/17/25 2225 97.5 °F (36.4 °C) 73 100 % 18 198/112 10 - Worst Possible Pain -- LC            Physical Exam  Constitutional:       Appearance: She is well-developed.   HENT:      Head: Normocephalic and atraumatic.     Cardiovascular:      Rate and Rhythm: Normal rate and regular rhythm.      Heart sounds: Normal heart sounds. No murmur heard.     No friction rub.   Pulmonary:      Effort: Pulmonary effort is normal. No respiratory distress.      Breath sounds: Normal breath sounds. No wheezing or rales.   Abdominal:      General: Bowel sounds are normal. There is no distension.      Palpations: Abdomen is soft.      Tenderness: There is abdominal tenderness.      Comments: Mild lower abdominal tenderness, no guarding or rigidity.  Not focal to the right lower quadrant     Musculoskeletal:         General: Normal range of motion.      Cervical back: Normal range of motion and neck supple.      Skin:     General: Skin is warm.     Neurological:      Mental Status: She is alert and oriented to person, place, and time.      Coordination: Coordination normal.     Psychiatric:         Behavior: Behavior normal.         Thought Content: Thought content normal.         Judgment: Judgment normal.         Results Reviewed       Procedure Component Value Units Date/Time    Urine Microscopic [[id number]]  (Abnormal) Collected: 07/17/25 2247    Lab Status: Final result Specimen: Urine, Clean Catch Updated: 07/17/25 2330     RBC, UA 10-20 /hpf      WBC, UA 1-2 /hpf      Epithelial Cells Occasional /hpf      Bacteria, UA Occasional /hpf      MUCUS THREADS Moderate    UA w Reflex to Microscopic w Reflex to Culture [[id number]]  (Abnormal) Collected: 07/17/25 2247    Lab Status: Final result Specimen: Urine, Clean Catch Updated: 07/17/25 2320     Color, UA Yellow     Clarity, UA Clear     Specific Gravity, UA >=1.030     pH, UA 5.5     Leukocytes, UA Negative     Nitrite, UA Negative     Protein, UA Trace mg/dl      Glucose, UA Negative mg/dl      Ketones, UA Negative mg/dl      Urobilinogen, UA <2.0 mg/dl      Bilirubin, UA Negative     Occult Blood, UA Large    CBC and differential [[id number]] Collected: 07/17/25 2253    Lab Status: Final result Specimen: Blood from Arm, Left Updated: 07/17/25 2300     WBC 7.08 Thousand/uL      RBC 4.58 Million/uL      Hemoglobin 13.1 g/dL      Hematocrit 40.3 %      MCV 88 fL      MCH 28.6 pg      MCHC 32.5 g/dL      RDW 12.6 %      MPV 9.2 fL      Platelets 276 Thousands/uL      nRBC 0 /100 WBCs      Segmented % 52 %      Immature Grans % 0 %      Lymphocytes % 36 %      Monocytes % 8 %      Eosinophils Relative 3 %      Basophils Relative 1 %      Absolute Neutrophils 3.76 Thousands/µL      Absolute Immature Grans 0.01 Thousand/uL      Absolute Lymphocytes 2.52 Thousands/µL      Absolute Monocytes 0.53 Thousand/µL      Eosinophils Absolute 0.22 Thousand/µL      Basophils Absolute  0.04 Thousands/µL     POCT pregnancy, urine [[id number]]  (Normal) Collected: 07/17/25 2252    Lab Status: Final result Updated: 07/17/25 2252     EXT Preg Test, Ur Negative     Control Valid            No orders to display       Procedures    ED Medication and Procedure Management   Prior to Admission Medications   Prescriptions Last Dose Informant Patient Reported? Taking?   naproxen (Naprosyn) 500 mg tablet   No No   Sig: Take 1 tablet (500 mg total) by mouth 2 (two) times a day with meals   naproxen (Naprosyn) 500 mg tablet   No No   Sig: Take 1 tablet (500 mg total) by mouth 2 (two) times a day with meals   ondansetron (ZOFRAN) 4 mg tablet   No No   Sig: Take 1 tablet (4 mg total) by mouth every 6 (six) hours   Patient not taking: Reported on 3/21/2025   ondansetron (ZOFRAN-ODT) 4 mg disintegrating tablet   No No   Sig: Take 1 tablet (4 mg total) by mouth every 8 (eight) hours as needed for nausea or vomiting   ondansetron (ZOFRAN-ODT) 4 mg disintegrating tablet   No No   Sig: Take 1 tablet (4 mg total) by mouth every 8 (eight) hours as needed for nausea or vomiting   valACYclovir (VALTREX) 1,000 mg tablet   No No   Sig: Take 2 tablets (2,000 mg total) by mouth 2 (two) times a day for 1 day      Facility-Administered Medications: None     Discharge Medication List as of 7/17/2025 11:17 PM        START taking these medications    Details   ibuprofen (MOTRIN) 400 mg tablet Take 1 tablet (400 mg total) by mouth every 6 (six) hours as needed for mild pain, Starting Thu 7/17/2025, Normal           CONTINUE these medications which have NOT CHANGED    Details   !! naproxen (Naprosyn) 500 mg tablet Take 1 tablet (500 mg total) by mouth 2 (two) times a day with meals, Starting Sat 5/31/2025, Normal      !! naproxen (Naprosyn) 500 mg tablet Take 1 tablet (500 mg total) by mouth 2 (two) times a day with meals, Starting Sat 5/31/2025, Normal      ondansetron (ZOFRAN) 4 mg tablet Take 1 tablet (4 mg total) by mouth every 6  (six) hours, Starting Thu 1/30/2025, Normal      !! ondansetron (ZOFRAN-ODT) 4 mg disintegrating tablet Take 1 tablet (4 mg total) by mouth every 8 (eight) hours as needed for nausea or vomiting, Starting Sat 5/31/2025, Normal      !! ondansetron (ZOFRAN-ODT) 4 mg disintegrating tablet Take 1 tablet (4 mg total) by mouth every 8 (eight) hours as needed for nausea or vomiting, Starting Sat 5/31/2025, Normal      valACYclovir (VALTREX) 1,000 mg tablet Take 2 tablets (2,000 mg total) by mouth 2 (two) times a day for 1 day, Starting Mon 8/19/2024, Until Tue 8/20/2024, Normal       !! - Potential duplicate medications found. Please discuss with provider.          ED SEPSIS DOCUMENTATION   Time reflects when diagnosis was documented in both MDM as applicable and the Disposition within this note       Time User Action Codes Description Comment    7/17/2025 11:17 PM Tico Willams Add [N92.0] Menorrhagia                    [1]   Past Medical History:  Diagnosis Date    Hypertension    [2]   Past Surgical History:  Procedure Laterality Date    BACK SURGERY     [3]   Family History  Problem Relation Name Age of Onset    Hypertension Mother      Hypertension Father     [4]   Social History  Tobacco Use    Smoking status: Never    Smokeless tobacco: Never   Vaping Use    Vaping status: Never Used   Substance Use Topics    Alcohol use: Yes     Comment: occasionally    Drug use: Not Currently        Tico Willams MD  07/19/25 3523

## 2025-07-28 ENCOUNTER — TELEPHONE (OUTPATIENT)
Dept: FAMILY MEDICINE CLINIC | Facility: CLINIC | Age: 35
End: 2025-07-28

## 2025-07-29 ENCOUNTER — TELEPHONE (OUTPATIENT)
Dept: ADMINISTRATIVE | Facility: OTHER | Age: 35
End: 2025-07-29